# Patient Record
Sex: MALE | Race: BLACK OR AFRICAN AMERICAN | Employment: OTHER | URBAN - METROPOLITAN AREA
[De-identification: names, ages, dates, MRNs, and addresses within clinical notes are randomized per-mention and may not be internally consistent; named-entity substitution may affect disease eponyms.]

---

## 2017-02-09 ENCOUNTER — TELEPHONE (OUTPATIENT)
Dept: INTERNAL MEDICINE CLINIC | Age: 57
End: 2017-02-09

## 2017-02-09 NOTE — TELEPHONE ENCOUNTER
----- Message from Guanako Meek LPN sent at 7/2/8419  8:18 AM EST -----  Regarding: FW:mauricio  Contact: [phone]  Patients reply.   ----- Message -----     From: Tenzin Mina. Sent: 2/7/2017   3:05 PM       To: [facility] [place]  Subject: Dante Chacon,    I have spoke with [address] [facility], [place]) and they have Taladafil Troches at 20mg for far less than standard Cialis 20mg tablets.  $6.00/pill v. $60/pill  They indicate that I have to ask you to fax them a prescription for this med. I haven't heard of Troches before. Is this quite new or just maybe generic version? Their fax number for forwarding a prescription is [phone] and office is [phone]    [facility] [place] [phone]    TADALAFIL TROCHES: Tadalafil is commonly prescribed for: Treatment of erectile dysfunction  Dosage strengths of Tadalafil:  10mg and 20mg  Dosage form available for Tadalafil is a seema. [facility]s troches are compounded under the stringent  guidelines. ----- Message -----  From: Noemi Castelan MD  Sent: 2/2/2017  5:09 PM EST  To: Tenzin Mina. Subject: RE:cialis    Yes but for ED you would get 10 pills / month. 5 mg costs same as 20 mg for ED. Would get 10 tabs of the 20 mg in that case. Hopefully that makes sense. These meds should be getting cheaper soon as generics become available over next year.      ----- Message -----     From: Tenzin Mina. Sent: 2/2/2017  1:19 PM EST       To: Noemi Castelan MD  Subject: RE:mauricio    Correct. .just ED that i know of. ....would taking four 5mg do same as 20mg?  ----- Message -----  From: Noemi Castelan MD  Sent: 2/2/2017 12:28 PM EST  To: Tenzin Mina. Subject: cialis    cialis 5 mg daily is for prostate problems. cialis 10-20 mg as needed is for erectile dysfunction. I assume we're treating erectile dysfunction as I have nothing in your record on prostate issues. Correct?

## 2017-02-10 ENCOUNTER — TELEPHONE (OUTPATIENT)
Dept: INTERNAL MEDICINE CLINIC | Age: 57
End: 2017-02-10

## 2017-02-10 DIAGNOSIS — N52.9 ERECTILE DYSFUNCTION, UNSPECIFIED ERECTILE DYSFUNCTION TYPE: Primary | ICD-10-CM

## 2017-02-10 RX ORDER — TADALAFIL 20 MG/1
20 TABLET ORAL AS NEEDED
Qty: 10 TAB | Refills: 12 | Status: SHIPPED | OUTPATIENT
Start: 2017-02-10 | End: 2021-08-17 | Stop reason: SDUPTHER

## 2017-02-10 NOTE — TELEPHONE ENCOUNTER
----- Message from Tiffanie Shah LPN sent at 8/95/0649 12:52 PM EST -----  Regarding: FW:mauricio  Contact: [phone]  Patients reply  ----- Message -----     From: Luis M Thomas. Sent: 2/9/2017  12:13 PM       To: [facility]  Subject: RE:cialis                                        Hi,  Viagra and Levitra both give  me blurred vision, headaches and backaches. Cialis is only one that has no side effects for me. I see [address] [place] [address] does sell regular Cialis 20mg tablets too. They will not give me pricing without prescription. Since [place] does not cover any ED meds at all, can we transfer my regular Cialis 20mg prescription from [facility] to [address] [place] [address], since I have to pay full price any way they may be cheaper than [facility]. [email or web address]. [address] Geannie Babinski [phone]  ----- Message -----  From: Staci Elliott MD  Sent: 2/9/2017 11:03 AM EST  To: Luis M Thomas. Subject: RE:cialis    I looked at their website on this drug.  guidelines only confirm safe practice in compounding the formulation but not distribution/ safety of that formulation for patients - that is how it distributes in the body/ blood levels etc even though it is same molecule as cialis. This information is important but unknown. Therefore, without FDA safety data and pharmacokinetic information, I'm hesitant to prescribe that due to risk. viagra should be much cheaper with coupons now and going generic soon however. Al    ----- Message -----     From: Luis M Thomas. Sent: 2/7/2017  3:05 PM EST       To: Staci Elliott MD  Subject: Niyah Weiss,    I have spoke with [address] [facility], [place]) and they have Taladafil Troches at 20mg for far less than standard Cialis 20mg tablets.  $6.00/pill v. $60/pill  They indicate that I have to ask you to fax them a prescription for this med. I haven't heard of Sukhdeep before.   Is this quite new or just maybe generic version? Their fax number for forwarding a prescription is [phone] and office is [phone]    Sirisha Tilley [phone]    TADALAFIL TROCHES: Tadalafil is commonly prescribed for: Treatment of erectile dysfunction  Dosage strengths of Tadalafil:  10mg and 20mg  Dosage form available for Tadalafil is a seema. [facility]s troches are compounded under the stringent  guidelines. ----- Message -----  From: Luz Sofia MD  Sent: 2/2/2017  5:09 PM EST  To: Wisam Lugo. Subject: RE:cialis    Yes but for ED you would get 10 pills / month. 5 mg costs same as 20 mg for ED. Would get 10 tabs of the 20 mg in that case. Hopefully that makes sense. These meds should be getting cheaper soon as generics become available over next year.      ----- Message -----     From: Wisam Lugo. Sent: 2/2/2017  1:19 PM EST       To: Luz Sofia MD  Subject: RE:jerricalis    Correct. .just ED that i know of. ....would taking four 5mg do same as 20mg?  ----- Message -----  From: Luz Sofia MD  Sent: 2/2/2017 12:28 PM EST  To: Wisam Lugo. Subject: cialis    cialis 5 mg daily is for prostate problems. cialis 10-20 mg as needed is for erectile dysfunction. I assume we're treating erectile dysfunction as I have nothing in your record on prostate issues. Correct?

## 2017-02-10 NOTE — TELEPHONE ENCOUNTER
----- Message from Laurence Philippe LPN sent at 6/67/0564 12:52 PM EST -----  Regarding: FW:mauricio  Contact: [phone]  Patients reply  ----- Message -----     From: Gaurav John. Sent: 2/9/2017  12:13 PM       To: [facility]  Subject: RE:cialis                                        Hi,  Viagra and Levitra both give  me blurred vision, headaches and backaches. Cialis is only one that has no side effects for me. I see [address] [place] [address] does sell regular Cialis 20mg tablets too. They will not give me pricing without prescription. Since [place] does not cover any ED meds at all, can we transfer my regular Cialis 20mg prescription from [facility] to [address] [place] [address], since I have to pay full price any way they may be cheaper than [facility]. [email or web address]/[email or web address]-tablets. [address] [phone]  ----- Message -----  From: Bhavana Michaels MD  Sent: 2/9/2017 11:03 AM EST  To: Gaurav John. Subject: RE:cialis    I looked at their website on this drug.  guidelines only confirm safe practice in compounding the formulation but not distribution/ safety of that formulation for patients - that is how it distributes in the body/ blood levels etc even though it is same molecule as cialis. This information is important but unknown. Therefore, without FDA safety data and pharmacokinetic information, I'm hesitant to prescribe that due to risk. viagra should be much cheaper with coupons now and going generic soon however. Al    ----- Message -----     From: Gaurav John. Sent: 2/7/2017  3:05 PM EST       To: Bhavana Michaels MD  Subject: Марина Holland,    I have spoke with [address] [facility], [place]) and they have Taladafil Troches at 20mg for far less than standard Cialis 20mg tablets.  $6.00/pill v. $60/pill  They indicate that I have to ask you to fax them a prescription for this med. I haven't heard of Sukhdeep before.   Is this quite new or just maybe generic version? Their fax number for forwarding a prescription is [phone] and office is [phone]    Carlidana Hammer [phone]    TADALAFIL TROCHES: Tadalafil is commonly prescribed for: Treatment of erectile dysfunction  Dosage strengths of Tadalafil:  10mg and 20mg  Dosage form available for Tadalafil is a seema. [facility]s troches are compounded under the stringent  guidelines. ----- Message -----  From: Ambar Mckeon MD  Sent: 2/2/2017  5:09 PM EST  To: Roger Yeung. Subject: RE:cialis    Yes but for ED you would get 10 pills / month. 5 mg costs same as 20 mg for ED. Would get 10 tabs of the 20 mg in that case. Hopefully that makes sense. These meds should be getting cheaper soon as generics become available over next year.      ----- Message -----     From: Roger Yeung. Sent: 2/2/2017  1:19 PM EST       To: Ambar Mckeon MD  Subject: RE:jerricalis    Correct. .just ED that i know of. ....would taking four 5mg do same as 20mg?  ----- Message -----  From: Ambar Mckeon MD  Sent: 2/2/2017 12:28 PM EST  To: Roger Yeung. Subject: cialis    cialis 5 mg daily is for prostate problems. cialis 10-20 mg as needed is for erectile dysfunction. I assume we're treating erectile dysfunction as I have nothing in your record on prostate issues. Correct?

## 2017-04-05 ENCOUNTER — TELEPHONE (OUTPATIENT)
Dept: INTERNAL MEDICINE CLINIC | Age: 57
End: 2017-04-05

## 2017-04-05 NOTE — TELEPHONE ENCOUNTER
Discussed with pt . He reports sun burning with untinted windows. He does not have photosensitivity diseases known. Does not have cutaneous sarcoid.   Forms completed with this info

## 2017-04-05 NOTE — TELEPHONE ENCOUNTER
Pt called and needs DMV forms signed. He would like them faxed to [phone] and the hardcopies mailed to his home address.

## 2017-04-06 ENCOUNTER — TELEPHONE (OUTPATIENT)
Dept: INTERNAL MEDICINE CLINIC | Age: 57
End: 2017-04-06

## 2017-04-06 NOTE — TELEPHONE ENCOUNTER
----- Message from Princess Kelley sent at 4/6/2017  7:33 AM EDT -----  Regarding: Dr. Manny Anderson has not received the [place] [employer] Application for DMV to cover the [facility] and [place]. His contact number is, [phone]. his fax number is, [phone].

## 2017-09-11 ENCOUNTER — HOSPITAL ENCOUNTER (OUTPATIENT)
Dept: GENERAL RADIOLOGY | Age: 57
Discharge: HOME | End: 2017-09-11
Payer: COMMERCIAL

## 2017-09-11 DIAGNOSIS — D86.9 SARCOID: ICD-10-CM

## 2017-09-11 PROCEDURE — 71020 XR CHEST PA LAT: CPT

## 2018-05-09 RX ORDER — OMEPRAZOLE 40 MG/1
CAPSULE, DELAYED RELEASE ORAL
Qty: 30 CAP | Refills: 4 | Status: SHIPPED | OUTPATIENT
Start: 2018-05-09 | End: 2021-08-17 | Stop reason: SDUPTHER

## 2021-08-17 ENCOUNTER — OFFICE VISIT (OUTPATIENT)
Dept: FAMILY MEDICINE CLINIC | Age: 61
End: 2021-08-17
Payer: COMMERCIAL

## 2021-08-17 VITALS
DIASTOLIC BLOOD PRESSURE: 72 MMHG | BODY MASS INDEX: 39.34 KG/M2 | SYSTOLIC BLOOD PRESSURE: 133 MMHG | TEMPERATURE: 97.8 F | RESPIRATION RATE: 17 BRPM | OXYGEN SATURATION: 97 % | HEART RATE: 78 BPM | WEIGHT: 281 LBS | HEIGHT: 71 IN

## 2021-08-17 DIAGNOSIS — Z76.89 ENCOUNTER TO ESTABLISH CARE: ICD-10-CM

## 2021-08-17 DIAGNOSIS — N52.9 ERECTILE DYSFUNCTION, UNSPECIFIED ERECTILE DYSFUNCTION TYPE: ICD-10-CM

## 2021-08-17 DIAGNOSIS — E66.9 CLASS 2 OBESITY WITH BODY MASS INDEX (BMI) OF 39.0 TO 39.9 IN ADULT, UNSPECIFIED OBESITY TYPE, UNSPECIFIED WHETHER SERIOUS COMORBIDITY PRESENT: Primary | ICD-10-CM

## 2021-08-17 DIAGNOSIS — E11.65 TYPE 2 DIABETES MELLITUS WITH HYPERGLYCEMIA, WITHOUT LONG-TERM CURRENT USE OF INSULIN (HCC): ICD-10-CM

## 2021-08-17 DIAGNOSIS — D86.9 SARCOIDOSIS: ICD-10-CM

## 2021-08-17 DIAGNOSIS — M79.604 RIGHT LEG PAIN: ICD-10-CM

## 2021-08-17 PROCEDURE — 99204 OFFICE O/P NEW MOD 45 MIN: CPT | Performed by: STUDENT IN AN ORGANIZED HEALTH CARE EDUCATION/TRAINING PROGRAM

## 2021-08-17 RX ORDER — OMEPRAZOLE 40 MG/1
40 CAPSULE, DELAYED RELEASE ORAL DAILY
Qty: 90 CAPSULE | Refills: 3 | Status: SHIPPED | OUTPATIENT
Start: 2021-08-17

## 2021-08-17 RX ORDER — DICLOFENAC SODIUM 50 MG/1
50 TABLET, DELAYED RELEASE ORAL 2 TIMES DAILY
Qty: 60 TABLET | Refills: 0 | Status: SHIPPED | OUTPATIENT
Start: 2021-08-17 | End: 2021-09-16

## 2021-08-17 RX ORDER — TIOTROPIUM BROMIDE AND OLODATEROL 3.124; 2.736 UG/1; UG/1
2 SPRAY, METERED RESPIRATORY (INHALATION) DAILY
Qty: 3 INHALER | Refills: 3 | Status: SHIPPED | OUTPATIENT
Start: 2021-08-17

## 2021-08-17 RX ORDER — AZELASTINE HYDROCHLORIDE, FLUTICASONE PROPIONATE 137; 50 UG/1; UG/1
SPRAY, METERED NASAL
COMMUNITY
End: 2021-08-23 | Stop reason: SDUPTHER

## 2021-08-17 RX ORDER — TADALAFIL 20 MG/1
20 TABLET ORAL AS NEEDED
Qty: 30 TABLET | Refills: 1 | Status: SHIPPED | OUTPATIENT
Start: 2021-08-17 | End: 2021-08-23 | Stop reason: SDUPTHER

## 2021-08-17 NOTE — PROGRESS NOTES
Robin Bui. is a 59 y.o. male    Chief Complaint   Patient presents with   [facility], med renew. Referrals for toe nail fungus, skin tags, opthamalogist,  right leg pain, and colonoscopy       1. Have you been to the ER, urgent care clinic since your last visit? Hospitalized since your last visit? No  2. Have you seen or consulted any other health care providers outside of the [address] [place] [address] since your last visit? Include any pap smears or colon screening.  No    Visit Vitals  /72 (BP 1 Location: Right arm)   Pulse 78   Temp 97.8 °F (36.6 °C) (Temporal)   Resp 17   Ht 5' 11\" (1.803 m)   Wt 281 lb (127.5 kg)   SpO2 97%   BMI 39.19 kg/m²

## 2021-08-17 NOTE — PROGRESS NOTES
Lulu Goel. is an 59 y.o. male who presents to hospitals care   Patient was previously receiving care at: [facility], recently changed insurance so moving back to this clinic  Medical history significant for:    NACHO - on CPAP  Sarcoid - F/w pulm Dr. Dee Rachel  T2DM - A1C of 12 in 2011 however has been well controlled since that time - not currently on any meds  ED - takes Cialis PRN    Today pt complaining of some R leg pain that he has occasionally  Sometimes pain shoots down leg, sometimes he feels like it \"gives out\"  No loss of control of bowel or bladder, no saddle anesthesia  Has taken diclofenac PO previously which works well for the pain  Interested in following up w/ sports med    Review of Systems   General/Constitutional:   No headache, fever, fatigue, weight loss or weight gain       Eyes:   No redness, pruritis, pain, visual changes, swelling, or discharge      Ears:    No pain, loss or changes in hearing     Neck:   No swelling, masses, stiffness, pain, or limited movement     Cardiac:    No chest pain      Respiratory:   No cough or shortness of breath     GI:   No nausea/vomiting, diarrhea, abdominal pain, bloody or dark stools       :   No dysuria or  hematuria    Neurological:   No loss of consciousness, dizziness, seizures, dysarthria, cognitive changes, memory changes,  problems with balance, or unilateral weakness     Skin: No rash     Current Medications  Current medications include:   Current Outpatient Medications   Medication Sig    azelastine-fluticasone (Dymista) 137-50 mcg/spray spry by Nasal route.  omeprazole (PRILOSEC) 40 mg capsule Take 1 Capsule by mouth daily.  tadalafiL (Cialis) 20 mg tablet Take 1 Tablet by mouth as needed for Erectile Dysfunction. Indications: the inability to have an erection    tiotropium-olodateroL (Stiolto Respimat) 2.5-2.5 mcg/actuation inhaler Take 2 Puffs by inhalation daily.     diclofenac EC (VOLTAREN) 50 mg EC tablet Take 1 Tablet by mouth two (2) times a day for 30 days. No current facility-administered medications for this visit. Allergies  No Known Allergies    Past Medical History  Past Medical History:   Diagnosis Date    AR (allergic rhinitis)     Arthritis     [facility]     Diabetes [facility]     ED (erectile dysfunction)     GERD (gastroesophageal reflux disease)     History of stress test 5/5/15    pt states results were normal    Hyperlipidemia LDL goal < 100     Sarcoidosis 2015    Skin tag(s)     Tinea pedis        Past Surgical History   Past Surgical History:   Procedure Laterality Date    CHEST SURGERY PROCEDURE UNLISTED      bronchoscopy May 2015 diagnosed sarcoidosis    HX APPENDECTOMY      HX HEENT      sinus lifts    HX SMALL BOWEL RESECTION      scar tissue from appendectomy in  caused obstruction    ORAL SURGERY PROCEDURE      dental implants    IA EXC SKIN BENIG 2.1-3CM TRUNK,ARM,LEG  2011    seborrheic keratosis excised from right upper back and left forearm       Family History  Family History   Problem Relation Age of Onset    Cancer Mother 50        breast    Cancer Father         prostate    Diabetes Father     Hypertension Father     Other Father         gout    Heart Disease Neg Hx      No FH of colon cancer: No but pt did have adenomatous poly removed last colonoscopy.  Due for 5-yr f/u w/ GI Dr Dilcia Ramsey History     Socioeconomic History    Marital status:      Spouse name: Not on file    Number of children: Not on file    Years of education: Not on file    Highest education level: Not on file   Occupational History    Not on file   Tobacco Use    Smoking status: Former Smoker     Packs/day: 3.00     Years: 30.00     Pack years: 90.00     Types: Cigarettes     Quit date: 2009     Years since quittin.6    Smokeless tobacco: Former User     Quit date:  Substance and Sexual Activity    Alcohol use: Yes     Alcohol/week: 6.0 standard drinks     Types: 2 Standard drinks or equivalent, 1 Cans of beer, 3 Shots of liquor per week    Drug use: No     Comment: history of not currently     Sexual activity: Not Currently   Other Topics Concern    Not on file   Social History Narrative    Not on file     Social Determinants of Health     Financial Resource Strain:     Difficulty of Paying Living Expenses:    Food Insecurity:     Worried About Running Out of Food in the Last Year:     Ran Out of Food in the Last Year:    Transportation Needs:     Lack of Transportation (Medical):  Lack of Transportation (Non-Medical):    Physical Activity:     Days of Exercise per Week:     Minutes of Exercise per Session:    Stress:     Feeling of Stress :    Social Connections:     Frequency of Communication with Friends and Family:     Frequency of Social Gatherings with Friends and Family:     Attends Sikh Services:     Active Member of Clubs or Organizations:     Attends Club or Organization Meetings:     Marital Status:    Intimate Partner Violence:     Fear of Current or Ex-Partner:     Emotionally Abused:     Physically Abused:     Sexually Abused:        Immunizations  Immunization History   Administered Date(s) Administered    (RETIRED) Pneumococcal Vaccine (Unspecified Type) 01/31/2011    COVID-19, J&J, PF, 0.5 mL Dose 03/19/2021    Influenza Vaccine 10/01/2015    Influenza Vaccine Air Button) PF (>6 Mo Flulaval, Fluarix, and >3 Yrs Afluria, Fluzone 64435) 12/17/2014    TDAP Vaccine 01/31/2011       Health Maintenance  Colonoscopy: Due for 5yr f/u  DEXA scan: NA  Hepatitis C testing:  Will get today  Lung cancer screening: F/w pulmonology    Objective   Vital Signs  Visit Vitals  /72 (BP 1 Location: Right arm)   Pulse 78   Temp 97.8 °F (36.6 °C) (Temporal)   Resp 17   Ht 5' 11\" (1.803 m)   Wt 281 lb (127.5 kg)   SpO2 97%   BMI 39.19 kg/m² Physical Examination  GEN: No apparent distress. Alert and oriented and responds to all questions appropriately. EYES:  Conjunctiva clear; extraocular movements areintact. EAR: External ears are normal.   NOSE: Turbinates are within normal limits. No drainage  OROPHYARYNX: No oral lesions or exudates. NECK:  Supple; no masses; thyroid normal           LUNGS: Respirations unlabored; clear to auscultation bilaterally  CARDIOVASCULAR: Regular, rate, and rhythm without murmurs, gallops or rubs   ABDOMEN: Soft; nontender; nondistended; normoactive bowel sounds; no masses or organomegaly  NEUROLOGIC:  No focal neurologic deficits. Strength and sensation grossly intact. Coordination and gait grossly intact. EXT: Well perfused. No edema. Good ROM to BLE, no weakness or deficit to RLE. SKIN: No obvious rashes. Assessment:   Kathy Miranda is a 59 y.o. here to establish to care     Plan:     Diagnoses and all orders for this visit:    1. Class 2 obesity with body mass index (BMI) of 39.0 to 39.9 in adult, unspecified obesity type, unspecified whether serious comorbidity present  -     LIPID PANEL; Future  -     omeprazole (PRILOSEC) 40 mg capsule; Take 1 Capsule by mouth daily. 2. Encounter to establish care  -     CBC W/O DIFF; Future  -     HEPATITIS C AB; Future  -     REFERRAL TO GASTROENTEROLOGY    3. Type 2 diabetes mellitus with hyperglycemia, without long-term current use of insulin (HCC)  -     METABOLIC PANEL, BASIC; Future  -     LIPID PANEL; Future  -     HEMOGLOBIN A1C WITH EAG; Future  -     REFERRAL TO OPHTHALMOLOGY    4. Erectile dysfunction, unspecified erectile dysfunction type  -     tadalafiL (Cialis) 20 mg tablet; Take 1 Tablet by mouth as needed for Erectile Dysfunction. Indications: the inability to have an erection    5. Sarcoidosis  -     tiotropium-olodateroL (Stiolto Respimat) 2.5-2.5 mcg/actuation inhaler; Take 2 Puffs by inhalation daily.     6. Right leg pain  - diclofenac EC (VOLTAREN) 50 mg EC tablet; Take 1 Tablet by mouth two (2) times a day for 30 days. · Counseled re: diet, exercise, healthy lifestyle  · Appropriate labs, vaccines, imaging studies, and referrals ordered as listed above  · Discussed the patient's BMI with him. The BMI follow up plan is as follows: Continue dietary changes, consider referral to dietician in the future. · The patient was counseled on the dangers of tobacco use, and was advised to quit. Reviewed strategies to maximize success, including written materials. Follow-up and Dispositions    · Return in about 4 weeks (around 9/14/2021) for Wellness exam.         I discussed the aforementioned diagnoses with the patient as well as the plan of care. All questions were answered and an AVS was provided.      I discussed this patient with Dr. Carmenza Galdamez (Attending Physician)      Signed By:  Bebe Lawler MD

## 2021-08-17 NOTE — PATIENT INSTRUCTIONS
Well Visit, Men 48 to 72: Care Instructions  Overview     Well visits can help you stay healthy. Your doctor has checked your overall health and may have suggested ways to take good care of yourself. Your doctor also may have recommended tests. At home, you can help prevent illness with healthy eating, regular exercise, and other steps. Follow-up care is a key part of your treatment and safety. Be sure to make and go to all appointments, and call your doctor if you are having problems. It's also a good idea to know your test results and keep a list of the medicines you take. How can you care for yourself at home? · Get screening tests that you and your doctor decide on. Screening helps find diseases before any symptoms appear. · Eat healthy foods. Choose fruits, vegetables, whole grains, protein, and low-fat dairy foods. Limit fat, especially saturated fat. Reduce salt in your diet. · Limit alcohol. Have no more than 2 drinks a day or 14 drinks a week. · Get at least 30 minutes of exercise on most days of the week. Walking is a good choice. You also may want to do other activities, such as running, swimming, cycling, or playing tennis or team sports. · Reach and stay at a healthy weight. This will lower your risk for many problems, such as obesity, diabetes, heart disease, and high blood pressure. · Do not smoke. Smoking can make health problems worse. If you need help quitting, talk to your doctor about stop-smoking programs and medicines. These can increase your chances of quitting for good. · Care for your mental health. It is easy to get weighed down by worry and stress. Learn strategies to manage stress, like deep breathing and mindfulness, and stay connected with your family and community. If you find you often feel sad or hopeless, talk with your doctor. Treatment can help. · Talk to your doctor about whether you have any risk factors for sexually transmitted infections (STIs).  You can help prevent STIs if you wait to have sex with a new partner (or partners) until you've each been tested for STIs. It also helps if you use condoms (male or female condoms) and if you limit your sex partners to one person who only has sex with you. Vaccines are available for some STIs. · If it's important to you to prevent pregnancy with your partner, talk with your doctor about birth control options that might be best for you. · If you think you may have a problem with alcohol or drug use, talk to your doctor. This includes prescription medicines (such as amphetamines and opioids) and illegal drugs (such as cocaine and methamphetamine). Your doctor can help you figure out what type of treatment is best for you. · Protect your skin from too much sun. When you're outdoors from 10 a.m. to 4 p.m., stay in the shade or cover up with clothing and a hat with a wide brim. Wear sunglasses that block UV rays. Even when it's cloudy, put broad-spectrum sunscreen (SPF 30 or higher) on any exposed skin. · See a dentist one or two times a year for checkups and to have your teeth cleaned. · Wear a seat belt in the car. When should you call for help? Watch closely for changes in your health, and be sure to contact your doctor if you have any problems or symptoms that concern you. Where can you learn more? Go to http://www.gray.com/  Enter Q428 in the search box to learn more about \"Well Visit, Men 48 to 72: Care Instructions. \"  Current as of: May 27, 2020               Content Version: 12.8  © 6897-8012 Healthwise, [employer]orporated. Care instructions adapted under license by [employer] (which disclaims liability or warranty for this information). If you have questions about a medical condition or this instruction, always ask your healthcare professional. [place] [address] any warranty or liability for your use of this information.

## 2021-08-18 LAB
ANION GAP SERPL CALC-SCNC: 6 MMOL/L (ref 5–15)
BUN SERPL-MCNC: 10 MG/DL (ref 6–20)
BUN/CREAT SERPL: 12 (ref 12–20)
CALCIUM SERPL-MCNC: 9.3 MG/DL (ref 8.5–10.1)
CHLORIDE SERPL-SCNC: 108 MMOL/L (ref 97–108)
CHOLEST SERPL-MCNC: 189 MG/DL
CO2 SERPL-SCNC: 25 MMOL/L (ref 21–32)
CREAT SERPL-MCNC: 0.82 MG/DL (ref 0.7–1.3)
ERYTHROCYTE [DISTWIDTH] IN BLOOD BY AUTOMATED COUNT: 13.3 % (ref 11.5–14.5)
EST. AVERAGE GLUCOSE BLD GHB EST-MCNC: 131 MG/DL
GLUCOSE SERPL-MCNC: 123 MG/DL (ref 65–100)
HBA1C MFR BLD: 6.2 % (ref 4–5.6)
HCT VFR BLD AUTO: 43.1 % (ref 36.6–50.3)
HCV AB SERPL QL IA: NONREACTIVE
HDLC SERPL-MCNC: 56 MG/DL
HDLC SERPL: 3.4 {RATIO} (ref 0–5)
HGB BLD-MCNC: 14.1 G/DL (ref 12.1–17)
LDLC SERPL CALC-MCNC: 108 MG/DL (ref 0–100)
MCH RBC QN AUTO: 30.5 PG (ref 26–34)
MCHC RBC AUTO-ENTMCNC: 32.7 G/DL (ref 30–36.5)
MCV RBC AUTO: 93.1 FL (ref 80–99)
NRBC # BLD: 0 K/UL (ref 0–0.01)
NRBC BLD-RTO: 0 PER 100 WBC
PLATELET # BLD AUTO: 219 K/UL (ref 150–400)
PMV BLD AUTO: 9.4 FL (ref 8.9–12.9)
POTASSIUM SERPL-SCNC: 4.2 MMOL/L (ref 3.5–5.1)
RBC # BLD AUTO: 4.63 M/UL (ref 4.1–5.7)
SODIUM SERPL-SCNC: 139 MMOL/L (ref 136–145)
TRIGL SERPL-MCNC: 125 MG/DL (ref ?–150)
VLDLC SERPL CALC-MCNC: 25 MG/DL
WBC # BLD AUTO: 4 K/UL (ref 4.1–11.1)

## 2021-08-23 DIAGNOSIS — N52.9 ERECTILE DYSFUNCTION, UNSPECIFIED ERECTILE DYSFUNCTION TYPE: ICD-10-CM

## 2021-08-23 RX ORDER — TADALAFIL 20 MG/1
20 TABLET ORAL AS NEEDED
Qty: 30 TABLET | Refills: 1 | Status: SHIPPED | OUTPATIENT
Start: 2021-08-23 | End: 2022-05-14

## 2021-08-23 RX ORDER — AZELASTINE HYDROCHLORIDE, FLUTICASONE PROPIONATE 137; 50 UG/1; UG/1
1 SPRAY, METERED NASAL 2 TIMES DAILY
Qty: 23 G | Refills: 3 | Status: SHIPPED | OUTPATIENT
Start: 2021-08-23 | End: 2022-05-12

## 2021-08-24 NOTE — PROGRESS NOTES
Hep C, lipid panel, CBC, BMP wnl. A1C at goal without medication. [employer] message and letter sent to pt.

## 2021-08-30 ENCOUNTER — OFFICE VISIT (OUTPATIENT)
Dept: FAMILY MEDICINE CLINIC | Age: 61
End: 2021-08-30
Payer: COMMERCIAL

## 2021-08-30 VITALS
TEMPERATURE: 97.1 F | HEART RATE: 68 BPM | WEIGHT: 282 LBS | OXYGEN SATURATION: 97 % | BODY MASS INDEX: 39.48 KG/M2 | DIASTOLIC BLOOD PRESSURE: 74 MMHG | SYSTOLIC BLOOD PRESSURE: 145 MMHG | HEIGHT: 71 IN | RESPIRATION RATE: 16 BRPM

## 2021-08-30 DIAGNOSIS — M79.604 RIGHT LEG PAIN: ICD-10-CM

## 2021-08-30 DIAGNOSIS — M54.41 RIGHT-SIDED LOW BACK PAIN WITH RIGHT-SIDED SCIATICA, UNSPECIFIED CHRONICITY: Primary | ICD-10-CM

## 2021-08-30 PROCEDURE — 99213 OFFICE O/P EST LOW 20 MIN: CPT | Performed by: FAMILY MEDICINE

## 2021-08-30 NOTE — PROGRESS NOTES
Jean Paul Blakely. is a 59 y.o. male    Chief Complaint   Patient presents with    Back Pain     low back pain that radiates down to his right foot. it's mostly aching but the pain does feel shooting/sharp when it radiates down his leg. states his right leg feels cooler than his left leg. has weakness in that leg as well when walking or standing for long periods of time. states he has notices some swelling - has been dx with sarcoidosis       1. Have you been to the ER, urgent care clinic since your last visit? Hospitalized since your last visit? No    2. Have you seen or consulted any other health care providers outside of the [address] [place] [address] since your last visit? Include any pap smears or colon screening. No      Visit Vitals  BP (!) 145/74 (BP 1 Location: Right arm, BP Patient Position: Sitting, BP Cuff Size: Large adult)   Pulse 68   Temp 97.1 °F (36.2 °C) (Temporal)   Resp 16   Ht 5' 11\" (1.803 m)   Wt 282 lb (127.9 kg)   SpO2 97%   BMI 39.33 kg/m²           Health Maintenance Due   Topic Date Due    Foot Exam Q1  Never done    Eye Exam Retinal or Dilated  Never done    Shingrix Vaccine Age 50> (1 of 2) Never done    Low dose CT lung screening  Never done    MICROALBUMIN Q1  08/25/2017    Colorectal Cancer Screening Combo  08/12/2020    DTaP/Tdap/Td series (2 - Td or Tdap) 01/31/2021         Medication Reconciliation completed, changes noted.   Please  Update medication list. Followed protocol

## 2021-08-30 NOTE — PROGRESS NOTES
[address] [facility]      Chief Complaint:   Chief Complaint   Patient presents with    Back Pain     low back pain that radiates down to his right foot. it's mostly aching but the pain does feel shooting/sharp when it radiates down his leg. states his right leg feels cooler than his left leg. has weakness in that leg as well when walking or standing for long periods of time. states he has notices some swelling - has been dx with sarcoidosis       Subjective:   History: This patient is a 59 y.o. male with a chief complaint of back pain. Patient states that he began experiencing low back pain that radiates down to his right foot. He noticed it especially a few years ago when he went to [place] [address] with his family and felt that he was slower than the rest of his family. At grocery stores, he occasionally uses a cart to lean on, which allows him to walk for a longer period of time. Also states that he is typically able to relieve the pain with standing, as the pain is worse with extended periods of walking. He notes he previously had groin numbness (after sitting on wallet while driving), but feels his pain now is dissimilar. Has purchased an ab roller for core toning, and feels it has helped. He has been doing a low back home exercise program for a couple of months with minimal benefit. No injury or trauma. No bowel or bladder incontinence. No fever, night sweats, or weight changes. No saddle anesthesia. Was previously seen by PT, and has continued home exercise program for back pain. Review of Systems:  General/Constitutional:  No fever, chills, sweats, fatigue, night sweats, weakness, weight loss or weight gain   Head: No headache, no trauma   Neck: No swelling, masses, stiffness, pain, or limited movement   Cardiac: No chest pain   Respiratory: No cough, shortness of breath, or dyspnea on exertion   GI: No incontinence. No nausea/vomiting, diarrhea, abdominal pain, bloody or dark stools  : No incontinence. No change in urinary habits. Peripheral Vascular: No edema, coldness, numbness, discoloration, pain, or paresthesias   Musculoskeletal: As per HPI  Neurological: No saddle distribution paresthesia. No siatic pain. No loss of consciousness, dizziness, seizures, dysarthria, cognitive changes, problems with balance, or unilateral weakness. Past Medical History:   Diagnosis Date    AR (allergic rhinitis)     Arthritis     [facility]     Diabetes ([facility]     ED (erectile dysfunction)     GERD (gastroesophageal reflux disease)     History of stress test 5/5/15    pt states results were normal    Hyperlipidemia LDL goal < 100     Sarcoidosis 4/2015    Skin tag(s)     Tinea pedis      Family History   Problem Relation Age of Onset    Cancer Mother 50        breast    Cancer Father         prostate    Diabetes Father     Hypertension Father     Other Father         gout    Heart Disease Neg Hx      Current Outpatient Medications   Medication Sig Dispense Refill    tadalafiL (Cialis) 20 mg tablet Take 1 Tablet by mouth as needed for Erectile Dysfunction. Indications: the inability to have an erection 30 Tablet 1    omeprazole (PRILOSEC) 40 mg capsule Take 1 Capsule by mouth daily. 90 Capsule 3    tiotropium-olodateroL (Stiolto Respimat) 2.5-2.5 mcg/actuation inhaler Take 2 Puffs by inhalation daily. 3 Inhaler 3    diclofenac EC (VOLTAREN) 50 mg EC tablet Take 1 Tablet by mouth two (2) times a day for 30 days. 60 Tablet 0    azelastine-fluticasone (Dymista) 137-50 mcg/spray spry 1 [place] by Both Nostrils route two (2) times a day.  (Patient not taking: Reported on 8/30/2021) 23 g 3     No Known Allergies  Social History     Socioeconomic History    Marital status:      Spouse name: Not on file    Number of children: Not on file    Years of education: Not on file    Highest education level: Not on file   Occupational History    Not on file   Tobacco Use    Smoking status: Former Smoker     Packs/day: 3.00     Years: 30.00     Pack years: 90.00     Types: Cigarettes     Quit date: 2009     Years since quittin.6    Smokeless tobacco: Former User     Quit date:    Substance and Sexual Activity    Alcohol use: Yes     Alcohol/week: 6.0 standard drinks     Types: 2 Standard drinks or equivalent, 1 Cans of beer, 3 Shots of liquor per week    Drug use: No     Comment: history of not currently     Sexual activity: Not Currently   Other Topics Concern    Not on file   Social History Narrative    Not on file     Social Determinants of Health     Financial Resource Strain:     Difficulty of Paying Living Expenses:    Food Insecurity:     Worried About Running Out of Food in the Last Year:     Ran Out of Food in the Last Year:    Transportation Needs:     Lack of Transportation (Medical):  Lack of Transportation (Non-Medical):    Physical Activity:     Days of Exercise per Week:     Minutes of Exercise per Session:    Stress:     Feeling of Stress :    Social Connections:     Frequency of Communication with Friends and Family:     Frequency of Social Gatherings with Friends and Family:     Attends Temple Services:     Active Member of Clubs or Organizations:     Attends Club or Organization Meetings:     Marital Status:    Intimate Partner Violence:     Fear of Current or Ex-Partner:     Emotionally Abused:     Physically Abused:     Sexually Abused:        Objective:     Visit Vitals  BP (!) 145/74 (BP 1 Location: Right arm, BP Patient Position: Sitting, BP Cuff Size: Large adult)   Pulse 68   Temp 97.1 °F (36.2 °C) (Temporal)   Resp 16   Ht 5' 11\" (1.803 m)   Wt 282 lb (127.9 kg)   SpO2 97%   BMI 39.33 kg/m²       General: Alert and oriented and in no acute distress. Responds to all questions appropriately.    LUNGS: Respirations unlabored. Skin: No obvious rash. MSK:    Posture: Normal   Deformity: None    ROM:     Flexion: Normal    Extension: Normal     Lateral bending: Normal      Gait: Normal       Palpation:    L1-L5: No tenderness    Sacrum: No tenderness    Coccyx: No tenderness    Left Paraspinal: No tenderness    Right Paraspinal: No tenderness     Strength (0-5/5)    Hip Flexion:   Left: 5/5  Right: 5/5    Hip Extension:  Left: 5/5  Right: 5/5    Hip Abduction:  Left: 5/5  Right: 5/5    Hip Adduction:  Left: 5/5  Right: 5/5    Knee Extension:  Left: 5/5  Right: 5/5    Knee Flexion:   Left: 5/5  Right: 5/5    Ankle dorsiflexion:  Left: 5/5  Right: 5/5    Ankle plantarflexion:  Left: 5/5  Right: 5/5    Great toe extension:  Left: 5/5  Right: 5/5     Sensation: Intact, no deficits      DTR:    Patella:  Left: +2  Right: +2    Achilles:  Left: +2  Right: +2     Special test:    Straight leg: Left: Negative  Right: negative    Tamaras: Left: Negative  Right: Negative    Piriformis: Left: Negative  Right: Negative      Assessment:   Low back pain with radicular sx down the right leg. Sx present with standing and walking and improving with rest and with back forward flexion. Sx likely due to foraminal or spinal stenosis. No improvement with HEP and NSAIDs. Will get MRI for further evaluation. Will also get ABIs to evaluate vascular claudication as cause of sx. Plan:   1. Home Exercise Program given, will consider PT if pt desires. 2. MRI low back to evaluate for foraminal/spinal stenosis. 3. Ice 15 minutes, three times a day PRN and after exercise. Can alternate with heat for 15 minutes. Medications:    1. Naproxin (Aleve): 220mg 1-2 tablets twice a day PRN. 2. Acetaminophen (Tylenol):  500mg 1-2 tablets every 6 hours as needed for pain.      RTC: TBD after MRI

## 2022-02-02 ENCOUNTER — TELEPHONE (OUTPATIENT)
Dept: FAMILY MEDICINE CLINIC | Age: 62
End: 2022-02-02

## 2022-02-02 NOTE — TELEPHONE ENCOUNTER
Hi Good PM  Pt called and asking about his referrals since he changed his Insurance to [facility] if he needs a new referral for colonoscopy and MRI?   Please advice    -Hemant Arteaga

## 2022-05-02 ENCOUNTER — TELEPHONE (OUTPATIENT)
Dept: FAMILY MEDICINE CLINIC | Age: 62
End: 2022-05-02

## 2022-05-02 NOTE — TELEPHONE ENCOUNTER
Hi Pt called wanted to ask questions to Dr Clive Magana regarding his Cialis prescription. Please call back at [phone].   Thank you      -karan

## 2022-05-03 ENCOUNTER — OFFICE VISIT (OUTPATIENT)
Dept: FAMILY MEDICINE CLINIC | Age: 62
End: 2022-05-03
Payer: MEDICARE

## 2022-05-03 VITALS
DIASTOLIC BLOOD PRESSURE: 72 MMHG | BODY MASS INDEX: 38.78 KG/M2 | TEMPERATURE: 97.3 F | WEIGHT: 277 LBS | HEART RATE: 74 BPM | SYSTOLIC BLOOD PRESSURE: 136 MMHG | OXYGEN SATURATION: 97 % | RESPIRATION RATE: 18 BRPM | HEIGHT: 71 IN

## 2022-05-03 DIAGNOSIS — N52.9 ERECTILE DYSFUNCTION, UNSPECIFIED ERECTILE DYSFUNCTION TYPE: ICD-10-CM

## 2022-05-03 DIAGNOSIS — E11.65 TYPE 2 DIABETES MELLITUS WITH HYPERGLYCEMIA, WITHOUT LONG-TERM CURRENT USE OF INSULIN (HCC): ICD-10-CM

## 2022-05-03 DIAGNOSIS — Z13.220 ENCOUNTER FOR LIPID SCREENING FOR CARDIOVASCULAR DISEASE: ICD-10-CM

## 2022-05-03 DIAGNOSIS — R53.82 CHRONIC FATIGUE: Primary | ICD-10-CM

## 2022-05-03 DIAGNOSIS — Z01.83 ENCOUNTER FOR BLOOD TYPING: ICD-10-CM

## 2022-05-03 DIAGNOSIS — Z13.6 ENCOUNTER FOR LIPID SCREENING FOR CARDIOVASCULAR DISEASE: ICD-10-CM

## 2022-05-03 LAB
ABO + RH BLD: NORMAL
ALBUMIN SERPL-MCNC: 3.9 G/DL (ref 3.5–5)
ALBUMIN/GLOB SERPL: 1.1 {RATIO} (ref 1.1–2.2)
ALP SERPL-CCNC: 70 U/L (ref 45–117)
ALT SERPL-CCNC: 32 U/L (ref 12–78)
ANION GAP SERPL CALC-SCNC: 11 MMOL/L (ref 5–15)
AST SERPL-CCNC: 25 U/L (ref 15–37)
BASOPHILS # BLD: 0 K/UL (ref 0–0.1)
BASOPHILS NFR BLD: 1 % (ref 0–1)
BILIRUB SERPL-MCNC: 0.7 MG/DL (ref 0.2–1)
BLOOD BANK CMNT PATIENT-IMP: NORMAL
BLOOD GROUP ANTIBODIES SERPL: NORMAL
BUN SERPL-MCNC: 14 MG/DL (ref 6–20)
BUN/CREAT SERPL: 13 (ref 12–20)
CALCIUM SERPL-MCNC: 9.3 MG/DL (ref 8.5–10.1)
CHLORIDE SERPL-SCNC: 104 MMOL/L (ref 97–108)
CHOLEST SERPL-MCNC: 211 MG/DL
CO2 SERPL-SCNC: 23 MMOL/L (ref 21–32)
CREAT SERPL-MCNC: 1.09 MG/DL (ref 0.7–1.3)
CREAT UR-MCNC: 320 MG/DL
DIFFERENTIAL METHOD BLD: ABNORMAL
EOSINOPHIL # BLD: 0.1 K/UL (ref 0–0.4)
EOSINOPHIL NFR BLD: 2 % (ref 0–7)
ERYTHROCYTE [DISTWIDTH] IN BLOOD BY AUTOMATED COUNT: 13.7 % (ref 11.5–14.5)
EST. AVERAGE GLUCOSE BLD GHB EST-MCNC: 137 MG/DL
GLOBULIN SER CALC-MCNC: 3.7 G/DL (ref 2–4)
GLUCOSE SERPL-MCNC: 171 MG/DL (ref 65–100)
HBA1C MFR BLD: 6.4 % (ref 4–5.6)
HCT VFR BLD AUTO: 42.5 % (ref 36.6–50.3)
HDLC SERPL-MCNC: 49 MG/DL
HDLC SERPL: 4.3 {RATIO} (ref 0–5)
HGB BLD-MCNC: 13.9 G/DL (ref 12.1–17)
IMM GRANULOCYTES # BLD AUTO: 0 K/UL (ref 0–0.04)
IMM GRANULOCYTES NFR BLD AUTO: 0 % (ref 0–0.5)
LDLC SERPL CALC-MCNC: 117.4 MG/DL (ref 0–100)
LYMPHOCYTES # BLD: 1.2 K/UL (ref 0.8–3.5)
LYMPHOCYTES NFR BLD: 44 % (ref 12–49)
MCH RBC QN AUTO: 30.3 PG (ref 26–34)
MCHC RBC AUTO-ENTMCNC: 32.7 G/DL (ref 30–36.5)
MCV RBC AUTO: 92.6 FL (ref 80–99)
MICROALBUMIN UR-MCNC: 1.4 MG/DL
MICROALBUMIN/CREAT UR-RTO: 4 MG/G (ref 0–30)
MONOCYTES # BLD: 0.4 K/UL (ref 0–1)
MONOCYTES NFR BLD: 14 % (ref 5–13)
NEUTS SEG # BLD: 1.1 K/UL (ref 1.8–8)
NEUTS SEG NFR BLD: 39 % (ref 32–75)
NRBC # BLD: 0 K/UL (ref 0–0.01)
NRBC BLD-RTO: 0 PER 100 WBC
PLATELET # BLD AUTO: 240 K/UL (ref 150–400)
PMV BLD AUTO: 9.1 FL (ref 8.9–12.9)
POTASSIUM SERPL-SCNC: 3.9 MMOL/L (ref 3.5–5.1)
PROT SERPL-MCNC: 7.6 G/DL (ref 6.4–8.2)
RBC # BLD AUTO: 4.59 M/UL (ref 4.1–5.7)
SODIUM SERPL-SCNC: 138 MMOL/L (ref 136–145)
SPECIMEN EXP DATE BLD: NORMAL
TRIGL SERPL-MCNC: 223 MG/DL (ref ?–150)
TSH SERPL DL<=0.05 MIU/L-ACNC: 0.79 UIU/ML (ref 0.36–3.74)
VLDLC SERPL CALC-MCNC: 44.6 MG/DL
WBC # BLD AUTO: 2.9 K/UL (ref 4.1–11.1)

## 2022-05-03 PROCEDURE — G8432 DEP SCR NOT DOC, RNG: HCPCS | Performed by: STUDENT IN AN ORGANIZED HEALTH CARE EDUCATION/TRAINING PROGRAM

## 2022-05-03 PROCEDURE — G8427 DOCREV CUR MEDS BY ELIG CLIN: HCPCS | Performed by: STUDENT IN AN ORGANIZED HEALTH CARE EDUCATION/TRAINING PROGRAM

## 2022-05-03 PROCEDURE — 2022F DILAT RTA XM EVC RTNOPTHY: CPT | Performed by: STUDENT IN AN ORGANIZED HEALTH CARE EDUCATION/TRAINING PROGRAM

## 2022-05-03 PROCEDURE — 3017F COLORECTAL CA SCREEN DOC REV: CPT | Performed by: STUDENT IN AN ORGANIZED HEALTH CARE EDUCATION/TRAINING PROGRAM

## 2022-05-03 PROCEDURE — 99213 OFFICE O/P EST LOW 20 MIN: CPT | Performed by: STUDENT IN AN ORGANIZED HEALTH CARE EDUCATION/TRAINING PROGRAM

## 2022-05-03 PROCEDURE — G8417 CALC BMI ABV UP PARAM F/U: HCPCS | Performed by: STUDENT IN AN ORGANIZED HEALTH CARE EDUCATION/TRAINING PROGRAM

## 2022-05-03 PROCEDURE — 3046F HEMOGLOBIN A1C LEVEL >9.0%: CPT | Performed by: STUDENT IN AN ORGANIZED HEALTH CARE EDUCATION/TRAINING PROGRAM

## 2022-05-03 NOTE — PROGRESS NOTES
[facility] [address] [place] [address]   Office [phone], Fax [phone]    Subjective:     Chief Complaint   Patient presents with    Extremity Weakness     Patient having leg weakness, muscle loss and would like to discuss possible low T     History provided by patient     Dileep Nowak. is a 57 y.o. male presents to discuss R leg weakness, fatigue. He would like his blood type checked and to be checked for low testosterone. Fatigue  - Present for years  - + Associated decreased libido   - Never been diagnosed with low testosterone or thyroid issues     R leg weakness/heaviness  - Down the back/side of his R leg  - Occurs with activity, relieved with rest and leaning forward  - Saw Sports medicine 8/2021 and has known degenerative disease in his back. He has an MRI scheduled for this coming Monday to further evaluate  - Has done PT previously which only helped minimally, he is doing home exercises only at this time. Also using a massage gun with relief of pain     Type 2 DM: Most recent A1C 6.3 8/2021  - No current medications, does not check his blood sugar  - Working on healthy diet/exercise       Medication reviewed. Current Outpatient Medications:     tadalafiL (Cialis) 20 mg tablet, Take 1 Tablet by mouth as needed for Erectile Dysfunction. Indications: the inability to have an erection, Disp: 30 Tablet, Rfl: 1    omeprazole (PRILOSEC) 40 mg capsule, Take 1 Capsule by mouth daily. , Disp: 90 Capsule, Rfl: 3    tiotropium-olodateroL (Stiolto Respimat) 2.5-2.5 mcg/actuation inhaler, Take 2 Puffs by inhalation daily. , Disp: 3 Inhaler, Rfl: 3    azelastine-fluticasone (Dymista) 137-50 mcg/spray spry, 1 [place] by Both Nostrils route two (2) times a day. (Patient not taking: Reported on 8/30/2021), Disp: 23 g, Rfl: 3     Allergy reviewed.   No Known Allergies     Past Medical History:   Diagnosis Date    AR (allergic rhinitis)     Arthritis     [facility]     Diabetes ([facility]     ED (erectile dysfunction)     GERD (gastroesophageal reflux disease)     History of stress test 5/5/15    pt states results were normal    Hyperlipidemia LDL goal < 100     Sarcoidosis 2015    Skin tag(s)     Tinea pedis        Social History     Socioeconomic History    Marital status:    Tobacco Use    Smoking status: Former Smoker     Packs/day: 3.00     Years: 30.00     Pack years: 90.00     Types: Cigarettes     Quit date: 2009     Years since quittin.3    Smokeless tobacco: Former User     Quit date:    Substance and Sexual Activity    Alcohol use: Yes     Alcohol/week: 6.0 standard drinks     Types: 2 Standard drinks or equivalent, 1 Cans of beer, 3 Shots of liquor per week    Drug use: No     Comment: history of not currently     Sexual activity: Not Currently       Review of Systems   Constitutional: Negative for chills and fever. Respiratory: Negative for cough and shortness of breath. Cardiovascular: Negative for chest pain and palpitations. Gastrointestinal: Negative for abdominal pain, diarrhea and nausea. Musculoskeletal: Negative for myalgias. + R Leg pain/weakness   Skin: Negative for rash. Neurological: Negative for dizziness and headaches. Objective:   Vitals - reviewed  Visit Vitals  /72 (BP 1 Location: Right arm, BP Patient Position: Sitting, BP Cuff Size: Large adult)   Pulse 74   Temp 97.3 °F (36.3 °C) (Temporal)   Resp 18   Ht 5' 11\" (1.803 m)   Wt 277 lb (125.6 kg)   SpO2 97%   BMI 38.63 kg/m²        Physical exam:   GEN: NAD. Alert. Well nourished. EYES:  Conjunctiva clear  NECK:  Supple; no masses; thyroid normal           LUNGS: Respirations unlabored; CTAB. no wheeze, rales, rhonchi   CARDIOVASCULAR: Regular, rate, and rhythm without murmurs, gallops or rubs   ABDOMEN: Soft; NT, ND. +bowel sounds; no rebound tenderness, no guarding.  no masses or organomegaly  NEUROLOGIC:  No focal neurologic deficits. Strength and sensation grossly intact. MSK: FROM in all extremities (both passive and active). Good tone. Posture and gait normal. 5/5 strength LE b/l, normal sensation b/l   EXT: Well perfused. No edema. No erythema. PSYCH: appropriate mood and affect. Good insight and judgement. Cooperative. SKIN: No obvious rashes or lesions. Assessment and orders:       ICD-10-CM ICD-9-CM    1. Chronic fatigue  R53.82 780.79 TSH 3RD GENERATION      TESTOSTERONE, FREE & TOTAL      CBC WITH AUTOMATED DIFF      CBC WITH AUTOMATED DIFF      TESTOSTERONE, FREE & TOTAL      TSH 3RD GENERATION   2. Type 2 diabetes mellitus with hyperglycemia, without long-term current use of insulin (HCC)  E11.65 250.00 HEMOGLOBIN A1C WITH EAG     790.29 CBC WITH AUTOMATED DIFF      METABOLIC PANEL, COMPREHENSIVE      MICROALBUMIN, UR, RAND W/ MICROALB/CREAT RATIO      METABOLIC PANEL, COMPREHENSIVE      CBC WITH AUTOMATED DIFF      HEMOGLOBIN A1C WITH EAG   3. Erectile dysfunction, unspecified erectile dysfunction type  N52.9 607.84 TESTOSTERONE, FREE & TOTAL      TESTOSTERONE, FREE & TOTAL   4. Encounter for lipid screening for cardiovascular disease  Z13.220 V77.91 LIPID PANEL    Z13.6 V81.2 LIPID PANEL   5. Encounter for blood typing  Z01.83 V72.86 TYPE & SCREEN      TYPE & SCREEN     Fatigue  - TSH, testosterone, CBC   - If labs normal- may consider PHQ9 at follow up    R leg/back pain: Likely foraminal vs spinal stenosis vs claudication   - Has MRI scheduled next week. ABIs were recommended but do not appear to be ordered. If MRI normal will proceed with ABIs  - Follow up with Sports Medicine clinic     Type 2 DM   - Labs as above  - Counseled on diet/exercise      Follow up in 1-2 weeks for annual physical     Pt was discussed with Dr Tobin Lizarraga (attending physician). I have reviewed patient medical and social history and medications. I have reviewed pertinent labs results and other data.  I have discussed the diagnosis with the patient and the intended plan as seen in the above orders. The patient has received an after-visit summary and questions were answered concerning future plans. I have discussed medication side effects and warnings with the patient as well.     Kerry Lambert,   Resident [facility]  05/03/22

## 2022-05-03 NOTE — PROGRESS NOTES
Identified Patient with two Patient identifiers (Name and ). Two Patient Identifiers confirmed. Reviewed record in preparation for visit and have obtained necessary documentation. Chief Complaint   Patient presents with    Extremity Weakness     Patient having leg weakness, muscle loss and would like to discuss possible low T       Visit Vitals  /72 (BP 1 Location: Right arm, BP Patient Position: Sitting, BP Cuff Size: Large adult)   Pulse 74   Temp 97.3 °F (36.3 °C) (Temporal)   Resp 18   Ht 5' 11\" (1.803 m)   Wt 277 lb (125.6 kg)   SpO2 97%   BMI 38.63 kg/m²       1. Have you been to the ER, urgent care clinic since your last visit? Hospitalized since your last visit? No    2. Have you seen or consulted any other health care providers outside of the [address] [place] [address] since your last visit? Include any pap smears or colon screening.  No

## 2022-05-04 DIAGNOSIS — D70.9 NEUTROPENIA, UNSPECIFIED TYPE (HCC): Primary | ICD-10-CM

## 2022-05-04 NOTE — PROGRESS NOTES
A + blood type, CMP, TSH ok  CBC with neutropenia (mild, abs neutrophils 1.1). No current infection. WBC minimally decreased over the last three blood draws. Will get peripheral smear, HIV, hepatitis panel, and folate/B12 for further eval to start  Lipid panel with elevated LDL, tchol, and TG. ASCVD 13%. Patient previously had side effects of his vision related to statins so will work on diet/exercise only for now  A1C 6.4, lifestyle modifications discussed again    Called patient to discuss results. He is agreeable to plan above.

## 2022-05-05 LAB
TESTOST FREE SERPL-MCNC: 6.7 PG/ML (ref 6.6–18.1)
TESTOST SERPL-MCNC: 277 NG/DL (ref 264–916)

## 2022-05-09 ENCOUNTER — LAB ONLY (OUTPATIENT)
Dept: FAMILY MEDICINE CLINIC | Age: 62
End: 2022-05-09

## 2022-05-09 ENCOUNTER — HOSPITAL ENCOUNTER (OUTPATIENT)
Dept: MRI IMAGING | Age: 62
Discharge: HOME | End: 2022-05-09
Attending: FAMILY MEDICINE
Payer: MEDICAID

## 2022-05-09 DIAGNOSIS — D70.9 NEUTROPENIA, UNSPECIFIED TYPE (HCC): ICD-10-CM

## 2022-05-09 DIAGNOSIS — M54.41 RIGHT-SIDED LOW BACK PAIN WITH RIGHT-SIDED SCIATICA, UNSPECIFIED CHRONICITY: ICD-10-CM

## 2022-05-09 PROCEDURE — 72148 MRI LUMBAR SPINE W/O DYE: CPT

## 2022-05-10 LAB
FOLATE SERPL-MCNC: 8 NG/ML (ref 5–21)
HAV IGM SER QL: NONREACTIVE
HBV CORE IGM SER QL: NONREACTIVE
HBV SURFACE AG SER QL: <0.1 INDEX
HBV SURFACE AG SER QL: NEGATIVE
HCV AB SERPL QL IA: NONREACTIVE
HIV 1+2 AB+HIV1 P24 AG SERPL QL IA: NONREACTIVE
HIV12 RESULT COMMENT, HHIVC: NORMAL
PERIPHERAL SMEAR,PSM: NORMAL
SP1: NORMAL
SP2: NORMAL
SP3: NORMAL
VIT B12 SERPL-MCNC: 141 PG/ML (ref 193–986)

## 2022-05-12 DIAGNOSIS — E53.8 VITAMIN B12 DEFICIENCY: Primary | ICD-10-CM

## 2022-05-12 DIAGNOSIS — D70.9 NEUTROPENIA, UNSPECIFIED TYPE (HCC): ICD-10-CM

## 2022-05-12 PROBLEM — E51.9 VITAMIN B1 DEFICIENCY: Status: ACTIVE | Noted: 2022-05-12

## 2022-05-12 RX ORDER — CYANOCOBALAMIN (VITAMIN B-12) 2000 MCG
2000 TABLET ORAL DAILY
Qty: 30 TABLET | Refills: 2 | Status: SHIPPED | OUTPATIENT
Start: 2022-05-12 | End: 2022-05-19

## 2022-05-12 NOTE — PROGRESS NOTES
HIV, hep panel NR  Folate wnl  Vit B12 low at 141- may be the cause of mild neutropenia. Will start replacement orally and recheck CBC and VitB12 in 4-6 weeks.

## 2022-05-12 NOTE — PROGRESS NOTES
Periph smear with mild leukocytopenia with neutropenia, otherwise normal, no blasts, no schistocytes     Called patient to discuss results. Awaiting HIV, hep panel, B12/folate.  Discussed with him may still refer to heme/onc based on results

## 2022-05-13 DIAGNOSIS — N52.9 ERECTILE DYSFUNCTION, UNSPECIFIED ERECTILE DYSFUNCTION TYPE: ICD-10-CM

## 2022-05-14 RX ORDER — TADALAFIL 20 MG/1
TABLET ORAL
Qty: 30 TABLET | Refills: 0 | Status: SHIPPED | OUTPATIENT
Start: 2022-05-14 | End: 2022-06-16

## 2022-05-19 ENCOUNTER — TELEPHONE (OUTPATIENT)
Dept: ONCOLOGY | Age: 62
End: 2022-05-19

## 2022-05-19 ENCOUNTER — OFFICE VISIT (OUTPATIENT)
Dept: FAMILY MEDICINE CLINIC | Age: 62
End: 2022-05-19
Payer: MEDICAID

## 2022-05-19 VITALS
WEIGHT: 276 LBS | HEART RATE: 68 BPM | SYSTOLIC BLOOD PRESSURE: 128 MMHG | TEMPERATURE: 98.2 F | OXYGEN SATURATION: 98 % | DIASTOLIC BLOOD PRESSURE: 68 MMHG | BODY MASS INDEX: 38.64 KG/M2 | HEIGHT: 71 IN | RESPIRATION RATE: 17 BRPM

## 2022-05-19 DIAGNOSIS — Z12.2 SCREENING FOR LUNG CANCER: ICD-10-CM

## 2022-05-19 DIAGNOSIS — D70.9 NEUTROPENIA, UNSPECIFIED TYPE (HCC): ICD-10-CM

## 2022-05-19 DIAGNOSIS — Z00.00 HEALTHCARE MAINTENANCE: Primary | ICD-10-CM

## 2022-05-19 DIAGNOSIS — M48.061 LUMBAR FORAMINAL STENOSIS: ICD-10-CM

## 2022-05-19 DIAGNOSIS — E53.8 VITAMIN B12 DEFICIENCY: ICD-10-CM

## 2022-05-19 DIAGNOSIS — Z12.11 COLON CANCER SCREENING: ICD-10-CM

## 2022-05-19 DIAGNOSIS — Z87.891 HISTORY OF TOBACCO ABUSE: ICD-10-CM

## 2022-05-19 DIAGNOSIS — Z80.42 FAMILY HISTORY OF PROSTATE CANCER: ICD-10-CM

## 2022-05-19 PROBLEM — E51.9 VITAMIN B1 DEFICIENCY: Status: RESOLVED | Noted: 2022-05-12 | Resolved: 2022-05-19

## 2022-05-19 PROCEDURE — 99214 OFFICE O/P EST MOD 30 MIN: CPT | Performed by: STUDENT IN AN ORGANIZED HEALTH CARE EDUCATION/TRAINING PROGRAM

## 2022-05-19 PROCEDURE — 99396 PREV VISIT EST AGE 40-64: CPT | Performed by: STUDENT IN AN ORGANIZED HEALTH CARE EDUCATION/TRAINING PROGRAM

## 2022-05-19 RX ORDER — CYANOCOBALAMIN 1000 UG/ML
1000 INJECTION, SOLUTION INTRAMUSCULAR; SUBCUTANEOUS ONCE
Status: COMPLETED | OUTPATIENT
Start: 2022-05-19 | End: 2022-05-19

## 2022-05-19 RX ADMIN — CYANOCOBALAMIN 1000 MCG: 1000 INJECTION, SOLUTION INTRAMUSCULAR; SUBCUTANEOUS at 15:26

## 2022-05-19 NOTE — PROGRESS NOTES
[address] [place] [address]   Office [phone], Fax [phone]    Subjective:     Chief Complaint   Patient presents with    Physical    Shoulder Pain     Right shoulder pain, going on for years. . Patient states no injury. Shoulder hurts when lifting. History of bone spurs and arthritis. History provided by patient     Lulu Goel. is a 57 y.o. male with PMHx of ED, type 2 DM, NACHO, HLD, vit B 12 def presents for annual physical.     Social  Diet: Trying to eat healthy with his wife, lots of vegetables and lean protein  Exercise: no formal exercise due to leg pain, about to join a gym though   Tobacco: Former smoker, quit 2009, smoked for 1-3ppd 30 years   Alcohol: occasionally, 4 liquor drinks at a time about twice per month   Illicit drug use: medical marijuana ocasioanlly     HM  Colonoscopy:  Last one 5 years ago, 1 polyp was removed and he was told to return in 5 years  Lung cancer screening: never had  Shingles: Declined  Pneumonia: Has had it       Medication reviewed. Current Outpatient Medications:     tadalafiL (CIALIS) 20 mg tablet, TAKE 1 TABLET BY MOUTH AS NEEDED, Disp: 30 Tablet, Rfl: 0    omeprazole (PRILOSEC) 40 mg capsule, Take 1 Capsule by mouth daily. , Disp: 90 Capsule, Rfl: 3    tiotropium-olodateroL (Stiolto Respimat) 2.5-2.5 mcg/actuation inhaler, Take 2 Puffs by inhalation daily. , Disp: 3 Inhaler, Rfl: 3    Current Facility-Administered Medications:     cyanocobalamin (VITAMIN B12) injection 1,000 mcg, 1,000 mcg, IntraMUSCular, ONCE, Gamaliel Ramos DO     Allergy reviewed.   No Known Allergies     Past Medical History:   Diagnosis Date    AR (allergic rhinitis)     Arthritis     COPD (chronic obstructive pulmonary disease) ([facility]     Diabetes ([facility]     ED (erectile dysfunction)     GERD (gastroesophageal reflux disease)     History of stress test 5/5/15    pt states results were normal    Skin tag(s)     Tinea pedis        Social History     Socioeconomic History    Marital status:    Tobacco Use    Smoking status: Former Smoker     Packs/day: 3.00     Years: 30.00     Pack years: 90.00     Types: Cigarettes     Quit date: 2009     Years since quittin.3    Smokeless tobacco: Former User     Quit date:    Substance and Sexual Activity    Alcohol use: Yes     Alcohol/week: 6.0 standard drinks     Types: 2 Standard drinks or equivalent, 1 Cans of beer, 3 Shots of liquor per week    Drug use: No     Comment: history of not currently     Sexual activity: Not Currently     Review of Systems   Constitutional: Negative for chills and fever. HENT: Negative for congestion and sore throat. Respiratory: Negative for cough and shortness of breath. Cardiovascular: Negative for chest pain and palpitations. Gastrointestinal: Negative for abdominal pain, nausea and vomiting. Genitourinary: Negative for dysuria. Musculoskeletal: Negative for myalgias. Skin: Negative for rash. Neurological: Negative for dizziness and headaches. Objective:   Vitals - reviewed  Visit Vitals  /68 (BP 1 Location: Right arm, BP Patient Position: Sitting, BP Cuff Size: Adult)   Pulse 68   Temp 98.2 °F (36.8 °C) (Oral)   Resp 17   Ht 5' 11\" (1.803 m)   Wt 276 lb (125.2 kg)   SpO2 98%   BMI 38.49 kg/m²      Physical exam:   GEN: NAD. Alert. Well nourished. EYES:  Conjunctiva clear     LUNGS: Respirations unlabored; CTAB. no wheeze, rales, rhonchi   CARDIOVASCULAR: Regular, rate, and rhythm without murmurs, gallops or rubs   ABDOMEN: Soft; NT, ND. +bowel sounds; no rebound tenderness, no guarding. no masses or organomegaly  NEUROLOGIC:  No focal neurologic deficits. Strength and sensation grossly intact. MSK: FROM in all extremities (both passive and active). Good tone. Posture and gait normal. 5/5 strength LE b/l, normal sensation b/l   EXT: Well perfused. No edema. No erythema. PSYCH: appropriate mood and affect.  Good insight and judgement. Cooperative. SKIN: No obvious rashes or lesions. Assessment and orders:       ICD-10-CM ICD-9-CM    1. Healthcare maintenance  Z00.00 V70.0    2. Lumbar foraminal stenosis  M48.061 724.02 REFERRAL TO ORTHOPEDICS   3. Neutropenia, unspecified type ([facility]  D70.9 288.00 REFERRAL TO HEMATOLOGY ONCOLOGY   4. Vitamin B12 deficiency  E53.8 266.2 REFERRAL TO HEMATOLOGY ONCOLOGY      INTRINSIC FACTOR AB   5. Colon cancer screening  Z12.11 V76.51 REFERRAL TO GASTROENTEROLOGY   6. History of tobacco abuse  Z87.891 V15.82 CT LOW DOSE LUNG CANCER SCREENING   7. Family history of prostate cancer  Z80.42 V16.42 PSA W/ REFLX FREE PSA   8. Screening for lung cancer  Z12.2 V76.0 CT LOW DOSE LUNG CANCER [address] on diet/exercise   - Labs previously ordered and reviewed  - Will add PSA to next lab draw as patients father had prostate cancer. Discussed risks vs benefits of testing and he agreed to proceed   - Low dose lung CT   - Referral to GI for colonoscopy     Vit B12 def/neutropenia  - Start Vit B12 IM injections once daily for 7 days and then weekly for 7 weeks  - Referral to Dr. Sid Carney, heme/onc  - Will add intrinsic factor Ab to next lab draw     Lumbar foraminal stenosis  - Referral to Dr. Pedro Salazar         Pt was discussed with Dr Keyanna Olivera (attending physician). I have reviewed patient medical and social history and medications. I have reviewed pertinent labs results and other data. I have discussed the diagnosis with the patient and the intended plan as seen in the above orders. The patient has received an after-visit summary and questions were answered concerning future plans. I have discussed medication side effects and warnings with the patient as well.     Giuliana Sullivan DO  Resident [facility]  05/19/22

## 2022-05-19 NOTE — TELEPHONE ENCOUNTER
Called patient to set up NP appt. No answer.  Left vm    NP / Neutrop[facility], unspecified type ([facility] / Ayush Esteves

## 2022-05-19 NOTE — PROGRESS NOTES
Chief Complaint   Patient presents with    Physical    Shoulder Pain     Right shoulder pain, going on for years. . Patient states no injury. Shoulder hurts when lifting. History of bone spurs and arthritis. Visit Vitals  /68 (BP 1 Location: Right arm, BP Patient Position: Sitting, BP Cuff Size: Adult)   Pulse 68   Temp 98.2 °F (36.8 °C) (Oral)   Resp 17   Ht 5' 11\" (1.803 m)   Wt 276 lb (125.2 kg)   SpO2 98%   BMI 38.49 kg/m²     1. Have you been to the ER, urgent care clinic since your last visit? Hospitalized since your last visit? No    2. Have you seen or consulted any other health care providers outside of the [address] [place] [address] since your last visit? Include any pap smears or colon screening. NO    Patient received Vit B12  IM in right arm.

## 2022-05-19 NOTE — PATIENT INSTRUCTIONS
*Take the vitamin B12 injections once a day for 1 week and then once per week for 7 weeks*      Vitamin B12 Deficiency: Care Instructions  Overview    A vitamin B12 deficiency means that your body doesn't have enough of this vitamin. You need vitamin B12 to keep red blood cells and nerve cells healthy. Not enough B12 can cause anemia. It can also damage nerves and cause trouble with memory and thinking. Many things can cause low levels of vitamin B12. They include:  · Not getting enough of this vitamin through food. · An autoimmune problem, like pernicious anemia. · Weight-loss surgery, like gastric bypass. · Long-term use of heartburn medicines. Low levels of B12 may not cause symptoms. But symptoms may include fatigue, depression, and thinking or memory problems. You may have tingling in your hands or feet and changes in the way you walk. Treatment depends on the reason for low vitamin B12. Eating more foods rich in B12 may be enough. Or you might take the vitamin as a pill, as shots, or as nasal spray. How can you care for yourself? · Take vitamin B12 as your doctor recommends. · Go to your appointments if you are getting B12 shots. · Eat more foods rich in vitamin B12. Examples are:  ? Animal products. These include meat, seafood, milk products, poultry, and eggs. ? Foods that have B12 added. These are called fortified foods. They include soy products, nutritional yeast, and dry cereals. · Work with a nutritionist or dietitian if you need help getting more vitamin B12 from food. · Talk to your doctor about stopping medicines if they are adding to your B12 deficiency. When should you call for help? Call 911  anytime you think you may need emergency care. For example, call if:    · You passed out (lost consciousness). Call your doctor now or seek immediate medical care if:    · You are dizzy or lightheaded, or you feel like you may faint. Watch closely for changes in your health.  Be sure to call your doctor if:    · You are confused or can't think clearly.     · You don't get better as expected. Where can you learn more? Go to http://www.gray.com/  Enter B352 in the search box to learn more about \"Vitamin B12 Deficiency: Care Instructions. \"  Current as of: November 29, 2021               Content Version: 13.2  © 3756-2391 [employer]. Care instructions adapted under license by [employer] (which disclaims liability or warranty for this information). If you have questions about a medical condition or this instruction, always ask your healthcare professional. [place] [address] any warranty or liability for your use of this information.

## 2022-05-20 ENCOUNTER — CLINICAL SUPPORT (OUTPATIENT)
Dept: FAMILY MEDICINE CLINIC | Age: 62
End: 2022-05-20
Payer: MEDICAID

## 2022-05-20 VITALS
DIASTOLIC BLOOD PRESSURE: 72 MMHG | BODY MASS INDEX: 38.92 KG/M2 | WEIGHT: 278 LBS | SYSTOLIC BLOOD PRESSURE: 147 MMHG | OXYGEN SATURATION: 95 % | HEART RATE: 83 BPM | HEIGHT: 71 IN | TEMPERATURE: 98.3 F | RESPIRATION RATE: 18 BRPM

## 2022-05-20 DIAGNOSIS — E53.8 VITAMIN B12 DEFICIENCY: Primary | ICD-10-CM

## 2022-05-20 PROBLEM — M48.061 LUMBAR FORAMINAL STENOSIS: Status: ACTIVE | Noted: 2022-05-20

## 2022-05-20 PROCEDURE — 96372 THER/PROPH/DIAG INJ SC/IM: CPT | Performed by: FAMILY MEDICINE

## 2022-05-20 RX ORDER — CYANOCOBALAMIN 1000 UG/ML
1000 INJECTION, SOLUTION INTRAMUSCULAR; SUBCUTANEOUS ONCE
Status: COMPLETED | OUTPATIENT
Start: 2022-05-20 | End: 2022-05-20

## 2022-05-20 RX ADMIN — CYANOCOBALAMIN 1000 MCG: 1000 INJECTION, SOLUTION INTRAMUSCULAR; SUBCUTANEOUS at 15:55

## 2022-05-20 NOTE — PROGRESS NOTES
Chief Complaint   Patient presents with    Injection B12     per dr Michelle Jeffery:    05/20/22 1551 05/20/22 1554   BP: (!) 162/86 (!) 147/72   BP 1 Location: Left upper arm Left upper arm   BP Patient Position: Sitting Sitting   BP Cuff Size: Large adult Large adult   Pulse: 83    Resp: 18    Height: 5' 11\" (1.803 m)    Weight: 278 lb (126.1 kg)    SpO2: 95%          Vitamin B12 given today, by Vashti Angel with patient's consent. No reactions noted. Advised pt to return for nurse visit.

## 2022-05-23 ENCOUNTER — CLINICAL SUPPORT (OUTPATIENT)
Dept: FAMILY MEDICINE CLINIC | Age: 62
End: 2022-05-23
Payer: MEDICAID

## 2022-05-23 DIAGNOSIS — E53.8 VITAMIN B12 DEFICIENCY: Primary | ICD-10-CM

## 2022-05-23 PROCEDURE — 96372 THER/PROPH/DIAG INJ SC/IM: CPT | Performed by: FAMILY MEDICINE

## 2022-05-23 RX ORDER — CYANOCOBALAMIN 1000 UG/ML
1000 INJECTION, SOLUTION INTRAMUSCULAR; SUBCUTANEOUS ONCE
Status: COMPLETED | OUTPATIENT
Start: 2022-05-23 | End: 2022-05-23

## 2022-05-23 RX ADMIN — CYANOCOBALAMIN 1000 MCG: 1000 INJECTION, SOLUTION INTRAMUSCULAR; SUBCUTANEOUS at 17:08

## 2022-05-23 NOTE — PROGRESS NOTES
Chief Complaint   Patient presents with    Injection B12     Vitamin B12 Immunization given today, by Tad Chris with patient's consent. No reactions noted. Advised pt to return for nurse visit.

## 2022-05-24 ENCOUNTER — CLINICAL SUPPORT (OUTPATIENT)
Dept: FAMILY MEDICINE CLINIC | Age: 62
End: 2022-05-24
Payer: MEDICAID

## 2022-05-24 VITALS — TEMPERATURE: 97.1 F

## 2022-05-24 DIAGNOSIS — E53.8 VITAMIN B12 DEFICIENCY: Primary | ICD-10-CM

## 2022-05-24 PROCEDURE — 96372 THER/PROPH/DIAG INJ SC/IM: CPT | Performed by: FAMILY MEDICINE

## 2022-05-24 RX ORDER — CYANOCOBALAMIN 1000 UG/ML
1000 INJECTION, SOLUTION INTRAMUSCULAR; SUBCUTANEOUS ONCE
Status: COMPLETED | OUTPATIENT
Start: 2022-05-24 | End: 2022-05-24

## 2022-05-24 RX ADMIN — CYANOCOBALAMIN 1000 MCG: 1000 INJECTION, SOLUTION INTRAMUSCULAR; SUBCUTANEOUS at 16:53

## 2022-05-25 ENCOUNTER — CLINICAL SUPPORT (OUTPATIENT)
Dept: FAMILY MEDICINE CLINIC | Age: 62
End: 2022-05-25
Payer: MEDICAID

## 2022-05-25 DIAGNOSIS — E53.8 VITAMIN B12 DEFICIENCY: Primary | ICD-10-CM

## 2022-05-25 PROCEDURE — 96372 THER/PROPH/DIAG INJ SC/IM: CPT | Performed by: FAMILY MEDICINE

## 2022-05-25 RX ORDER — CYANOCOBALAMIN 1000 UG/ML
1000 INJECTION, SOLUTION INTRAMUSCULAR; SUBCUTANEOUS ONCE
Status: COMPLETED | OUTPATIENT
Start: 2022-05-25 | End: 2022-05-25

## 2022-05-25 RX ADMIN — CYANOCOBALAMIN 1000 MCG: 1000 INJECTION, SOLUTION INTRAMUSCULAR; SUBCUTANEOUS at 11:03

## 2022-05-26 ENCOUNTER — CLINICAL SUPPORT (OUTPATIENT)
Dept: FAMILY MEDICINE CLINIC | Age: 62
End: 2022-05-26
Payer: MEDICAID

## 2022-05-26 VITALS
HEART RATE: 83 BPM | TEMPERATURE: 97.1 F | OXYGEN SATURATION: 95 % | WEIGHT: 278 LBS | HEIGHT: 71 IN | RESPIRATION RATE: 17 BRPM | DIASTOLIC BLOOD PRESSURE: 72 MMHG | SYSTOLIC BLOOD PRESSURE: 147 MMHG | BODY MASS INDEX: 38.92 KG/M2

## 2022-05-26 DIAGNOSIS — E53.8 VITAMIN B12 DEFICIENCY: Primary | ICD-10-CM

## 2022-05-26 PROCEDURE — 96372 THER/PROPH/DIAG INJ SC/IM: CPT | Performed by: FAMILY MEDICINE

## 2022-05-26 RX ORDER — CYANOCOBALAMIN 1000 UG/ML
1000 INJECTION, SOLUTION INTRAMUSCULAR; SUBCUTANEOUS ONCE
Status: COMPLETED | OUTPATIENT
Start: 2022-05-26 | End: 2022-05-26

## 2022-05-26 RX ADMIN — CYANOCOBALAMIN 1000 MCG: 1000 INJECTION, SOLUTION INTRAMUSCULAR; SUBCUTANEOUS at 15:32

## 2022-05-26 NOTE — PROGRESS NOTES
Chief Complaint   Patient presents with    Injection B12     1. Have you been to the ER, urgent care clinic since your last visit? Hospitalized since your last visit? No    2. Have you seen or consulted any other health care providers outside of the [address] [place] [address] since your last visit? Include any pap smears or colon screening.  No

## 2022-05-27 ENCOUNTER — CLINICAL SUPPORT (OUTPATIENT)
Dept: FAMILY MEDICINE CLINIC | Age: 62
End: 2022-05-27
Payer: MEDICAID

## 2022-05-27 VITALS
RESPIRATION RATE: 17 BRPM | HEIGHT: 71 IN | TEMPERATURE: 97.3 F | SYSTOLIC BLOOD PRESSURE: 147 MMHG | DIASTOLIC BLOOD PRESSURE: 72 MMHG | OXYGEN SATURATION: 95 % | BODY MASS INDEX: 38.92 KG/M2 | WEIGHT: 278 LBS | HEART RATE: 83 BPM

## 2022-05-27 DIAGNOSIS — E53.8 VITAMIN B12 DEFICIENCY: Primary | ICD-10-CM

## 2022-05-27 PROCEDURE — 96372 THER/PROPH/DIAG INJ SC/IM: CPT | Performed by: FAMILY MEDICINE

## 2022-05-27 RX ORDER — CYANOCOBALAMIN 1000 UG/ML
1000 INJECTION, SOLUTION INTRAMUSCULAR; SUBCUTANEOUS ONCE
Status: COMPLETED | OUTPATIENT
Start: 2022-05-27 | End: 2022-05-27

## 2022-05-27 RX ADMIN — CYANOCOBALAMIN 1000 MCG: 1000 INJECTION, SOLUTION INTRAMUSCULAR; SUBCUTANEOUS at 15:38

## 2022-05-27 NOTE — PROGRESS NOTES
Chief Complaint   Patient presents with    Injection B12     1. Have you been to the ER, urgent care clinic since your last visit? Hospitalized since your last visit? No    2. Have you seen or consulted any other health care providers outside of the [address] [place] [address] since your last visit? Include any pap smears or colon screening.  No

## 2022-06-01 ENCOUNTER — HOSPITAL ENCOUNTER (OUTPATIENT)
Dept: CT IMAGING | Age: 62
Discharge: HOME | End: 2022-06-01
Attending: STUDENT IN AN ORGANIZED HEALTH CARE EDUCATION/TRAINING PROGRAM
Payer: MEDICAID

## 2022-06-01 ENCOUNTER — CLINICAL SUPPORT (OUTPATIENT)
Dept: FAMILY MEDICINE CLINIC | Age: 62
End: 2022-06-01
Payer: MEDICAID

## 2022-06-01 VITALS
HEIGHT: 71 IN | HEART RATE: 83 BPM | BODY MASS INDEX: 38.92 KG/M2 | RESPIRATION RATE: 17 BRPM | DIASTOLIC BLOOD PRESSURE: 62 MMHG | TEMPERATURE: 97.8 F | SYSTOLIC BLOOD PRESSURE: 144 MMHG | OXYGEN SATURATION: 97 % | WEIGHT: 278 LBS

## 2022-06-01 DIAGNOSIS — Z12.2 SCREENING FOR LUNG CANCER: ICD-10-CM

## 2022-06-01 DIAGNOSIS — E53.8 VITAMIN B12 DEFICIENCY: Primary | ICD-10-CM

## 2022-06-01 DIAGNOSIS — Z87.891 HISTORY OF TOBACCO ABUSE: ICD-10-CM

## 2022-06-01 PROCEDURE — 71271 CT THORAX LUNG CANCER SCR C-: CPT

## 2022-06-01 PROCEDURE — 96372 THER/PROPH/DIAG INJ SC/IM: CPT | Performed by: FAMILY MEDICINE

## 2022-06-01 RX ORDER — CYANOCOBALAMIN 1000 UG/ML
1000 INJECTION, SOLUTION INTRAMUSCULAR; SUBCUTANEOUS ONCE
Status: COMPLETED | OUTPATIENT
Start: 2022-06-01 | End: 2022-06-01

## 2022-06-01 RX ADMIN — CYANOCOBALAMIN 1000 MCG: 1000 INJECTION, SOLUTION INTRAMUSCULAR; SUBCUTANEOUS at 16:02

## 2022-06-01 NOTE — PROGRESS NOTES
Chief Complaint   Patient presents with    Injection B12     1. Have you been to the ER, urgent care clinic since your last visit? Hospitalized since your last visit? No    2. Have you seen or consulted any other health care providers outside of the [address] [place] [address] since your last visit? Include any pap smears or colon screening.  No

## 2022-06-06 DIAGNOSIS — R53.82 CHRONIC FATIGUE: Primary | ICD-10-CM

## 2022-06-08 ENCOUNTER — CLINICAL SUPPORT (OUTPATIENT)
Dept: FAMILY MEDICINE CLINIC | Age: 62
End: 2022-06-08
Payer: MEDICAID

## 2022-06-08 ENCOUNTER — TELEPHONE (OUTPATIENT)
Dept: FAMILY MEDICINE CLINIC | Age: 62
End: 2022-06-08

## 2022-06-08 VITALS
SYSTOLIC BLOOD PRESSURE: 155 MMHG | TEMPERATURE: 97.8 F | RESPIRATION RATE: 17 BRPM | BODY MASS INDEX: 38.92 KG/M2 | HEART RATE: 90 BPM | DIASTOLIC BLOOD PRESSURE: 74 MMHG | WEIGHT: 278 LBS | HEIGHT: 71 IN

## 2022-06-08 DIAGNOSIS — E53.8 VITAMIN B12 DEFICIENCY: Primary | ICD-10-CM

## 2022-06-08 PROCEDURE — 96372 THER/PROPH/DIAG INJ SC/IM: CPT | Performed by: FAMILY MEDICINE

## 2022-06-08 RX ORDER — CYANOCOBALAMIN 1000 UG/ML
1000 INJECTION, SOLUTION INTRAMUSCULAR; SUBCUTANEOUS ONCE
Status: COMPLETED | OUTPATIENT
Start: 2022-06-08 | End: 2022-06-08

## 2022-06-08 RX ADMIN — CYANOCOBALAMIN 1000 MCG: 1000 INJECTION, SOLUTION INTRAMUSCULAR; SUBCUTANEOUS at 15:38

## 2022-06-08 NOTE — PROGRESS NOTES
Chief Complaint   Patient presents with    Injection B12     1. Have you been to the ER, urgent care clinic since your last visit? Hospitalized since your last visit? No    2. Have you seen or consulted any other health care providers outside of the [address] [place] [address] since your last visit? Include any pap smears or colon screening. No    B/P 161/77 today then 155/74. Instructed pt to check B/P daily at home and bring log next week.

## 2022-06-15 ENCOUNTER — CLINICAL SUPPORT (OUTPATIENT)
Dept: FAMILY MEDICINE CLINIC | Age: 62
End: 2022-06-15
Payer: MEDICAID

## 2022-06-15 VITALS
HEIGHT: 71 IN | TEMPERATURE: 97.1 F | WEIGHT: 278 LBS | BODY MASS INDEX: 38.92 KG/M2 | HEART RATE: 77 BPM | RESPIRATION RATE: 17 BRPM | DIASTOLIC BLOOD PRESSURE: 65 MMHG | SYSTOLIC BLOOD PRESSURE: 136 MMHG | OXYGEN SATURATION: 97 %

## 2022-06-15 DIAGNOSIS — E53.8 VITAMIN B12 DEFICIENCY: Primary | ICD-10-CM

## 2022-06-15 PROCEDURE — 96372 THER/PROPH/DIAG INJ SC/IM: CPT | Performed by: FAMILY MEDICINE

## 2022-06-15 RX ORDER — CYANOCOBALAMIN 1000 UG/ML
1000 INJECTION, SOLUTION INTRAMUSCULAR; SUBCUTANEOUS ONCE
Status: COMPLETED | OUTPATIENT
Start: 2022-06-15 | End: 2022-06-15

## 2022-06-15 RX ADMIN — CYANOCOBALAMIN 1000 MCG: 1000 INJECTION, SOLUTION INTRAMUSCULAR; SUBCUTANEOUS at 15:45

## 2022-06-15 NOTE — TELEPHONE ENCOUNTER
Pt in today for nurse visit.   Brought b/p log.    6/10/22   143/66 am                 127/66 pm  6/11/22   137/63 pm  6/13/22   137/73 am                 124/60 pm  6/15/22   144/71 am                 136/65 pm

## 2022-06-15 NOTE — PROGRESS NOTES
Chief Complaint   Patient presents with    Injection B12     1. Have you been to the ER, urgent care clinic since your last visit? Hospitalized since your last visit? No    2. Have you seen or consulted any other health care providers outside of the [address] [place] [address] since your last visit? Include any pap smears or colon screening.  No

## 2022-06-16 DIAGNOSIS — N52.9 ERECTILE DYSFUNCTION, UNSPECIFIED ERECTILE DYSFUNCTION TYPE: ICD-10-CM

## 2022-06-16 RX ORDER — TADALAFIL 20 MG/1
TABLET ORAL
Qty: 30 TABLET | Refills: 0 | Status: SHIPPED | OUTPATIENT
Start: 2022-06-16 | End: 2022-07-18 | Stop reason: SDUPTHER

## 2022-06-22 ENCOUNTER — CLINICAL SUPPORT (OUTPATIENT)
Dept: FAMILY MEDICINE CLINIC | Age: 62
End: 2022-06-22
Payer: MEDICAID

## 2022-06-22 VITALS — TEMPERATURE: 97.8 F | BODY MASS INDEX: 38.63 KG/M2 | WEIGHT: 277 LBS

## 2022-06-22 DIAGNOSIS — E53.8 VITAMIN B 12 DEFICIENCY: Primary | ICD-10-CM

## 2022-06-22 PROCEDURE — 96372 THER/PROPH/DIAG INJ SC/IM: CPT | Performed by: FAMILY MEDICINE

## 2022-06-22 RX ORDER — CYANOCOBALAMIN 1000 UG/ML
1000 INJECTION, SOLUTION INTRAMUSCULAR; SUBCUTANEOUS
Status: COMPLETED | OUTPATIENT
Start: 2022-06-22 | End: 2022-06-22

## 2022-06-22 RX ORDER — CYANOCOBALAMIN 1000 UG/ML
1000 INJECTION, SOLUTION INTRAMUSCULAR; SUBCUTANEOUS ONCE
Qty: 1 ML | Refills: 0
Start: 2022-06-22 | End: 2022-06-22 | Stop reason: SDUPTHER

## 2022-06-22 RX ADMIN — CYANOCOBALAMIN 1000 MCG: 1000 INJECTION, SOLUTION INTRAMUSCULAR; SUBCUTANEOUS at 15:27

## 2022-06-29 ENCOUNTER — CLINICAL SUPPORT (OUTPATIENT)
Dept: FAMILY MEDICINE CLINIC | Age: 62
End: 2022-06-29
Payer: MEDICAID

## 2022-06-29 VITALS — OXYGEN SATURATION: 97 % | SYSTOLIC BLOOD PRESSURE: 145 MMHG | DIASTOLIC BLOOD PRESSURE: 71 MMHG | HEART RATE: 71 BPM

## 2022-06-29 DIAGNOSIS — E53.8 VITAMIN B 12 DEFICIENCY: Primary | ICD-10-CM

## 2022-06-29 PROCEDURE — 96372 THER/PROPH/DIAG INJ SC/IM: CPT | Performed by: FAMILY MEDICINE

## 2022-06-29 RX ORDER — CYANOCOBALAMIN 1000 UG/ML
1000 INJECTION, SOLUTION INTRAMUSCULAR; SUBCUTANEOUS ONCE
Status: COMPLETED | OUTPATIENT
Start: 2022-06-29 | End: 2022-06-29

## 2022-06-29 RX ADMIN — CYANOCOBALAMIN 1000 MCG: 1000 INJECTION, SOLUTION INTRAMUSCULAR; SUBCUTANEOUS at 15:28

## 2022-06-29 NOTE — PROGRESS NOTES
Chief Complaint   Patient presents with    Injection B12     Visit Vitals  BP (!) 145/71 (BP 1 Location: Left arm, BP Patient Position: Sitting, BP Cuff Size: Adult)   Pulse 71   SpO2 97%     Patient has been given B12 injection 1000 mcg IM left deltoid. Patient tolerated well. No concerns.

## 2022-07-06 ENCOUNTER — CLINICAL SUPPORT (OUTPATIENT)
Dept: FAMILY MEDICINE CLINIC | Age: 62
End: 2022-07-06
Payer: MEDICAID

## 2022-07-06 VITALS
WEIGHT: 277.8 LBS | DIASTOLIC BLOOD PRESSURE: 61 MMHG | HEART RATE: 81 BPM | RESPIRATION RATE: 17 BRPM | OXYGEN SATURATION: 94 % | HEIGHT: 71 IN | SYSTOLIC BLOOD PRESSURE: 142 MMHG | BODY MASS INDEX: 38.89 KG/M2

## 2022-07-06 DIAGNOSIS — E53.8 VITAMIN B 12 DEFICIENCY: Primary | ICD-10-CM

## 2022-07-06 PROCEDURE — 96372 THER/PROPH/DIAG INJ SC/IM: CPT | Performed by: FAMILY MEDICINE

## 2022-07-06 RX ORDER — CYANOCOBALAMIN 1000 UG/ML
1000 INJECTION, SOLUTION INTRAMUSCULAR; SUBCUTANEOUS ONCE
Status: COMPLETED | OUTPATIENT
Start: 2022-07-06 | End: 2022-07-06

## 2022-07-06 RX ADMIN — CYANOCOBALAMIN 1000 MCG: 1000 INJECTION, SOLUTION INTRAMUSCULAR; SUBCUTANEOUS at 15:29

## 2022-07-06 NOTE — PROGRESS NOTES
Chief Complaint   Patient presents with    Injection B12     Visit Vitals  BP (!) 142/61 (BP 1 Location: Left arm, BP Patient Position: Sitting, BP Cuff Size: Adult)   Pulse 81   Resp 17   Ht 5' 11\" (1.803 m)   Wt 277 lb 12.8 oz (126 kg)   SpO2 94%   BMI 38.75 kg/m²       Administered B12 injection 1,000 mcg in right deltoid. No band-aid, allergic to the adhesive. Patient tolerates well. No signs or symptoms of distress.

## 2022-07-13 ENCOUNTER — CLINICAL SUPPORT (OUTPATIENT)
Dept: FAMILY MEDICINE CLINIC | Age: 62
End: 2022-07-13
Payer: MEDICAID

## 2022-07-13 VITALS
HEIGHT: 71 IN | DIASTOLIC BLOOD PRESSURE: 67 MMHG | RESPIRATION RATE: 17 BRPM | WEIGHT: 277 LBS | TEMPERATURE: 97 F | OXYGEN SATURATION: 98 % | SYSTOLIC BLOOD PRESSURE: 157 MMHG | HEART RATE: 76 BPM | BODY MASS INDEX: 38.78 KG/M2

## 2022-07-13 DIAGNOSIS — E53.8 VITAMIN B 12 DEFICIENCY: Primary | ICD-10-CM

## 2022-07-13 PROCEDURE — 96372 THER/PROPH/DIAG INJ SC/IM: CPT | Performed by: FAMILY MEDICINE

## 2022-07-13 RX ORDER — CYANOCOBALAMIN 1000 UG/ML
1000 INJECTION, SOLUTION INTRAMUSCULAR; SUBCUTANEOUS ONCE
Status: COMPLETED | OUTPATIENT
Start: 2022-07-13 | End: 2022-07-13

## 2022-07-13 RX ADMIN — CYANOCOBALAMIN 1000 MCG: 1000 INJECTION, SOLUTION INTRAMUSCULAR; SUBCUTANEOUS at 14:28

## 2022-07-13 NOTE — PROGRESS NOTES
Chief Complaint   Patient presents with    Injection B12     1. Have you been to the ER, urgent care clinic since your last visit? Hospitalized since your last visit? No    2. Have you seen or consulted any other health care providers outside of the [address] [place] [address] since your last visit? Include any pap smears or colon screening.  No

## 2022-07-18 DIAGNOSIS — N52.9 ERECTILE DYSFUNCTION, UNSPECIFIED ERECTILE DYSFUNCTION TYPE: ICD-10-CM

## 2022-07-18 RX ORDER — TADALAFIL 20 MG/1
20 TABLET ORAL AS NEEDED
Qty: 30 TABLET | Refills: 1 | Status: SHIPPED | OUTPATIENT
Start: 2022-07-18

## 2022-07-20 ENCOUNTER — LAB ONLY (OUTPATIENT)
Dept: FAMILY MEDICINE CLINIC | Age: 62
End: 2022-07-20

## 2022-07-20 DIAGNOSIS — D70.9 NEUTROPENIA, UNSPECIFIED TYPE (HCC): ICD-10-CM

## 2022-07-20 DIAGNOSIS — Z80.42 FAMILY HISTORY OF PROSTATE CANCER: ICD-10-CM

## 2022-07-20 DIAGNOSIS — E53.8 VITAMIN B12 DEFICIENCY: ICD-10-CM

## 2022-07-20 DIAGNOSIS — R53.82 CHRONIC FATIGUE: ICD-10-CM

## 2022-07-21 LAB
BASOPHILS # BLD: 0 K/UL (ref 0–0.1)
BASOPHILS NFR BLD: 1 % (ref 0–1)
DIFFERENTIAL METHOD BLD: ABNORMAL
EOSINOPHIL # BLD: 0.1 K/UL (ref 0–0.4)
EOSINOPHIL NFR BLD: 2 % (ref 0–7)
ERYTHROCYTE [DISTWIDTH] IN BLOOD BY AUTOMATED COUNT: 14 % (ref 11.5–14.5)
HCT VFR BLD AUTO: 44.5 % (ref 36.6–50.3)
HGB BLD-MCNC: 14.4 G/DL (ref 12.1–17)
IMM GRANULOCYTES # BLD AUTO: 0 K/UL (ref 0–0.04)
IMM GRANULOCYTES NFR BLD AUTO: 1 % (ref 0–0.5)
LYMPHOCYTES # BLD: 1.4 K/UL (ref 0.8–3.5)
LYMPHOCYTES NFR BLD: 37 % (ref 12–49)
MCH RBC QN AUTO: 30.7 PG (ref 26–34)
MCHC RBC AUTO-ENTMCNC: 32.4 G/DL (ref 30–36.5)
MCV RBC AUTO: 94.9 FL (ref 80–99)
MONOCYTES # BLD: 0.5 K/UL (ref 0–1)
MONOCYTES NFR BLD: 14 % (ref 5–13)
NEUTS SEG # BLD: 1.7 K/UL (ref 1.8–8)
NEUTS SEG NFR BLD: 45 % (ref 32–75)
NRBC # BLD: 0 K/UL (ref 0–0.01)
NRBC BLD-RTO: 0 PER 100 WBC
PLATELET # BLD AUTO: 268 K/UL (ref 150–400)
PMV BLD AUTO: 9.2 FL (ref 8.9–12.9)
RBC # BLD AUTO: 4.69 M/UL (ref 4.1–5.7)
VIT B12 SERPL-MCNC: 871 PG/ML (ref 193–986)
WBC # BLD AUTO: 3.7 K/UL (ref 4.1–11.1)

## 2022-07-22 LAB
IF BLOCK AB SER-ACNC: 1 AU/ML (ref 0–1.1)
PSA SERPL-MCNC: 0.4 NG/ML (ref 0–4)
REFLEX CRITERIA: NORMAL

## 2022-07-23 LAB
TESTOST FREE SERPL-MCNC: 10.1 PG/ML (ref 6.6–18.1)
TESTOST SERPL-MCNC: 380 NG/DL (ref 264–916)

## 2022-07-26 NOTE — PROGRESS NOTES
[id number] [facility] at [facility]  [phone]    Hematology / Oncology Consult    Reason for Visit:   Anthony Gil is a 57 y.o. male who is seen in consultation at the request of Dr. Erin Concepcion for evaluation of neutropenia. History of Present Illness:   Anthony Gil is a 57 y.o. male with COPD, DM, GERD, Arthritis is seen for neutropenia. He was recently diagnosed with VitB12 deficiency and has been started on B12 injections. He has several questions regarding whether he can switch from B12 injections to oral medications. He has h/o small bowel resection in . States he had a COVID infection in 2021. No unintentional weight loss, fevers, chills, sweats. He also reports diagnosis of Sarcoidosis, based on prior lung biopsy. He uses Stialto injections for this and states his disease is under control. Sees Dr. Anita Lipscomb. No recurrent or serious infections.          Past Medical History:   Diagnosis Date    AR (allergic rhinitis)     Arthritis     COPD (chronic obstructive pulmonary disease) ([address]     Diabetes ([facility]     ED (erectile dysfunction)     GERD (gastroesophageal reflux disease)     History of stress test 5/5/15    pt states results were normal    Skin tag(s)     Tinea pedis       Past Surgical History:   Procedure Laterality Date    HX APPENDECTOMY      HX HEENT      sinus lifts    HX SMALL BOWEL RESECTION      scar tissue from appendectomy in  caused obstruction    MT CHEST SURGERY PROCEDURE UNLISTED      bronchoscopy May 2015 diagnosed sarcoidosis    MT EXC SKIN BENIG 2.1-3CM TRUNK,ARM,LEG  2011    seborrheic keratosis excised from right upper back and left forearm    MT UNS ORAL SURG PROC BY REPORT      dental implants      Social History     Tobacco Use    Smoking status: Former     Packs/day: 3.00     Years: 30.00     Pack years: 90.00     Types: Cigarettes     Quit date: 2009     Years since quittin.5    Smokeless tobacco: Former     Quit date: 2009   Substance Use Topics    Alcohol use: Yes     Alcohol/week: 6.0 standard drinks     Types: 2 Standard drinks or equivalent, 1 Cans of beer, 3 Shots of liquor per week      Family History   Problem Relation Age of Onset    Cancer Mother 50        breast    Cancer Father         prostate    Diabetes Father     Hypertension Father     Other Father         gout    Heart Disease Neg Hx      Current Outpatient Medications   Medication Sig    tadalafiL (CIALIS) 20 mg tablet Take 1 Tablet by mouth as needed for Erectile Dysfunction. omeprazole (PRILOSEC) 40 mg capsule Take 1 Capsule by mouth daily. tiotropium-olodateroL (Stiolto Respimat) 2.5-2.5 mcg/actuation inhaler Take 2 Puffs by inhalation daily. No current facility-administered medications for this visit. No Known Allergies     Review of Systems: A complete review of systems was obtained, negative except as described above. Physical Exam:   Blood pressure (!) 146/61, pulse 73, resp. rate 20, height 5' 11\" (1.803 m), weight 280 lb (127 kg), SpO2 98 %. ECOG PS: 0  General: Well developed, no acute distress, obese  Eyes: PERRLA, EOMI, anicteric sclerae  HENT: Atraumatic, OP clear  Neck: Supple, no JVD or thyromegaly  Lymphatic: No cervical, supraclavicular, axillary adenopathy  Respiratory: CTAB, normal respiratory effort  CV: Normal rate, regular rhythm, no murmurs, no peripheral edema  GI: Soft, nontender, nondistended, no masses, no hepatomegaly, no splenomegaly  MS: Normal gait and station. Digits without clubbing or cyanosis. Skin: No rashes, ecchymoses, or petechiae. Normal temperature, turgor, and texture. Neuro/Psych: Alert, oriented. 5/5 strength in all 4 extremities. Appropriate affect, normal judgment/insight.     Results:     Lab Results   Component Value Date/Time    WBC 3.7 (L) 07/20/2022 01:35 PM    HGB 14.4 07/20/2022 01:35 PM    HCT 44.5 07/20/2022 01:35 PM    PLATELET 256 45/65/8127 01:35 PM MCV 94.9 07/20/2022 01:35 PM    ABS. NEUTROPHILS 1.7 (L) 07/20/2022 01:35 PM     Lab Results   Component Value Date/Time    Sodium 138 05/03/2022 09:00 AM    Potassium 3.9 05/03/2022 09:00 AM    Chloride 104 05/03/2022 09:00 AM    CO2 23 05/03/2022 09:00 AM    Glucose 171 (H) 05/03/2022 09:00 AM    BUN 14 05/03/2022 09:00 AM    Creatinine 1.09 05/03/2022 09:00 AM    GFR est AA >60 05/03/2022 09:00 AM    GFR est non-AA >60 05/03/2022 09:00 AM    Calcium 9.3 05/03/2022 09:00 AM    Glucose (POC) 99 11/09/2011 03:30 PM     Lab Results   Component Value Date/Time    Bilirubin, total 0.7 05/03/2022 09:00 AM    ALT (SGPT) 32 05/03/2022 09:00 AM    Alk. phosphatase 70 05/03/2022 09:00 AM    Protein, total 7.6 05/03/2022 09:00 AM    Albumin 3.9 05/03/2022 09:00 AM    Globulin 3.7 05/03/2022 09:00 AM     No results found for: IRON, FE, TIBC, IBCT, PSAT, FERR    Lab Results   Component Value Date/Time    Vitamin B12 871 07/20/2022 01:35 PM    Folate 8.0 05/09/2022 03:15 PM     Lab Results   Component Value Date/Time    TSH 0.79 05/03/2022 09:00 AM     Lab Results   Component Value Date/Time    Hepatitis A, IgM NONREACTIVE 05/09/2022 03:15 PM    Hepatitis B surface Ag <0.10 05/09/2022 03:15 PM    Hepatitis B core, IgM NONREACTIVE 05/09/2022 03:15 PM     No results found for: Russell Cervantes [id number], HCGAT      5/9/22 Peripheral blood, smear:   Normocytic normochromic erythrocytes; no increase in schistocytes   Mild leukocytopenia with neutropenia and morphology within normal limits;   no circulating blasts identified   Unremarkable platelets     Imaging:     Radiology report(s) reviewed. .      Assessment & Plan:   Santos Ding. is a 57 y.o. male is seen for neutropenia. 1. Neutropenia:  Mild and chronic with some improvement after starting on B12 injections. Peripheral smear unremarkable. Hep B profile negative.  The mild baseline neutropenia is likely due to sarcoidosis as well with some further suppression related to prior COVID infection and / or B12 deficiency. No B symptoms or recurrent infections. No further Hematology workup needed aside from HCV Ab, JEANNETTE to complete workup. -- Check HCV Ab, JEANNETTE to complete workup - will call with results. -- No further Hematology follow up needed. 2. H/o B12 deficiency / h/o small bowel resection:  B12 level low at 141 on 5/9/22. Agree that this is likely a result of prior small bowel resection resulting in decreased absorption. Now improved with monthly B12 injections. Discussed with patient that B12 injections are sure to maintain his levels as they do not rely on his intestines for absorption. However, if he chooses to switch to oral medications in the future, they may or may not be effective and his B12 deficiency may recur. While I recommend remaining on B12 injections, I defer to pt and PCP on whether he wants to do a trial of oral supplementation. Of note, after switching from B12 injections, it may take several months to years before noting low B12 levels and therefore, this needs to be monitored 1-2 times a year if B12 injections are discontinued. 3. Sarcoidosis:   Diagnosed on lung biopsy. On Stialto spray. Followed by Dr. Cas Fermin. I appreciate the opportunity to participate in Mr. Jorge Hathaway Jr.'s care.     Signed By: Vincent De La Torre MD     July 28, 2022

## 2022-07-26 NOTE — PROGRESS NOTES
Testosterone, intrinsic factor ab, PSA all wnl  Vit B12 871, much improved after IM injections. While discussing results, patient told me he had part of his small intestine removed when he was much younger from a bowel obstruction. This is likely the cause of his B12 def.  He still has follow up with heme later this week  CBC with WBC still slightly low, monoctyes 14, ANC increased to 1.7    Called patient to discuss results

## 2022-07-28 ENCOUNTER — OFFICE VISIT (OUTPATIENT)
Dept: ONCOLOGY | Age: 62
End: 2022-07-28
Payer: MEDICAID

## 2022-07-28 VITALS
HEART RATE: 73 BPM | BODY MASS INDEX: 39.2 KG/M2 | HEIGHT: 71 IN | WEIGHT: 280 LBS | DIASTOLIC BLOOD PRESSURE: 61 MMHG | OXYGEN SATURATION: 98 % | RESPIRATION RATE: 20 BRPM | SYSTOLIC BLOOD PRESSURE: 146 MMHG

## 2022-07-28 DIAGNOSIS — Z90.49 S/P SMALL BOWEL RESECTION: ICD-10-CM

## 2022-07-28 DIAGNOSIS — D70.8 OTHER NEUTROPENIA (HCC): Primary | ICD-10-CM

## 2022-07-28 DIAGNOSIS — D70.8 OTHER NEUTROPENIA (HCC): ICD-10-CM

## 2022-07-28 DIAGNOSIS — D86.9 SARCOIDOSIS: ICD-10-CM

## 2022-07-28 DIAGNOSIS — E53.8 VITAMIN B12 DEFICIENCY: ICD-10-CM

## 2022-07-28 LAB — HCV AB SERPL QL IA: NONREACTIVE

## 2022-07-28 PROCEDURE — 99204 OFFICE O/P NEW MOD 45 MIN: CPT | Performed by: INTERNAL MEDICINE

## 2022-07-28 NOTE — PROGRESS NOTES
Chief Complaint   Patient presents with    New Patient     Neutropenia, unspecified type (HCC)/ [place]       Visit Vitals  BP (!) 146/61   Pulse 73   Resp 20   Ht 5' 11\" (1.803 m)   Wt 280 lb (127 kg)   SpO2 98%   BMI 39.05 kg/m²

## 2022-07-29 LAB — ANA SER QL: NEGATIVE

## 2022-11-17 ENCOUNTER — PATIENT MESSAGE (OUTPATIENT)
Dept: FAMILY MEDICINE CLINIC | Age: 62
End: 2022-11-17

## 2022-11-17 DIAGNOSIS — N52.9 ERECTILE DYSFUNCTION, UNSPECIFIED ERECTILE DYSFUNCTION TYPE: Primary | ICD-10-CM

## 2022-11-18 RX ORDER — SILDENAFIL 25 MG/1
25 TABLET, FILM COATED ORAL
Qty: 30 TABLET | Refills: 1 | Status: SHIPPED | OUTPATIENT
Start: 2022-11-18

## 2022-11-18 NOTE — TELEPHONE ENCOUNTER
From: Cristian Fontenot. To: Scot Bowles DO  Sent: 11/17/2022 6:15 PM EST  Subject: ed meds    I have not requested a refill on generic Cialis in last few months. I would like to try another ED med. ..either generic Viagra, generic Levitra, or generic Stendra. ..if side effects are too bad. Sammy Christina I may ask to go back to generic Cialis. If you will, please forward a prescription to my Subha Kumar on file. ..or contact me at [phone] with any questions, etc. Thanks, Manolo Peters

## 2023-01-09 ENCOUNTER — PATIENT MESSAGE (OUTPATIENT)
Dept: FAMILY MEDICINE CLINIC | Age: 63
End: 2023-01-09

## 2023-01-09 DIAGNOSIS — N52.9 ERECTILE DYSFUNCTION, UNSPECIFIED ERECTILE DYSFUNCTION TYPE: Primary | ICD-10-CM

## 2023-01-19 RX ORDER — SILDENAFIL 100 MG/1
50 TABLET, FILM COATED ORAL
Qty: 30 TABLET | Refills: 1 | Status: SHIPPED | OUTPATIENT
Start: 2023-01-19

## 2023-01-19 NOTE — TELEPHONE ENCOUNTER
From: Jamal Wei. To: [facility]  Sent: 1/9/2023 2:45 PM EST  Subject: ED MED - VIAGRA    Can Dr. Anmol Hernadezn up my current dosage from 25mg to 100mg for my next refill(s)? 25mg is not doing the best!  I'll be ordering in the next few days.     Thanks, Meme Welch [phone]

## 2023-05-18 RX ORDER — OMEPRAZOLE 40 MG/1
40 CAPSULE, DELAYED RELEASE ORAL DAILY
COMMUNITY
Start: 2021-08-17 | End: 2023-06-06 | Stop reason: SDUPTHER

## 2023-05-18 RX ORDER — SILDENAFIL 100 MG/1
50 TABLET, FILM COATED ORAL DAILY PRN
COMMUNITY
Start: 2023-01-19 | End: 2023-06-23

## 2023-05-22 ENCOUNTER — PATIENT MESSAGE (OUTPATIENT)
Age: 63
End: 2023-05-22

## 2023-05-22 DIAGNOSIS — Z12.2 SCREENING FOR LUNG CANCER: Primary | ICD-10-CM

## 2023-05-25 ENCOUNTER — TELEPHONE (OUTPATIENT)
Age: 63
End: 2023-05-25

## 2023-05-25 NOTE — TELEPHONE ENCOUNTER
Per Dr. Solis, pt was called to schedule pt for lab and refills. Pt did not answer phone. A voice message was left, asking pt to return phone call.     Thank you

## 2023-05-25 NOTE — TELEPHONE ENCOUNTER
----- Message from Citlaly Jaramillo MD sent at 2023 10:40 AM EDT -----  Regarding: RE: follow up LDCT  Contact: [phone]  It looks like the patient saw Dr. Alexa Mackay a year ago for an annual wellness - would you mind calling the pt to schedule him for an appt with first available for labs and refills? It looks like Dr. Alexa Mackay referred the pt to ortho spine for the symptoms he described in his back, I'm not sure if he ever followed up with them. Thanks!  ----- Message -----  From: Viral Hobson LPN  Sent:   10:32 AM EDT  To: Ciltaly Jaramillo MD  Subject: FW: follow up LDCT                               Please advise, thank you!    ----- Message -----  From: Michelle Baer. Sent: 2023   1:12 PM EDT  To: [facility] Clinical Staff  Subject: follow up LDCT                                   OK..Thank your doctor. I believe it is approaching time for my Annual Physical, including (but not limiterd to) testing my levels for B-12, Testosterone, PSA, and A1C. For last few years, from my right back down thru my leg and down to my right foot. ..goes numb (almost useless) after walking 50 yards. Marta Berg as if I have been in a race. Marta Alvaek Marta Berg I have to stop (sit if I can) and rest till pain and numbness  goes away for me to start walking again. .I have even used a cane occassionally if a lot of walking is required for the day. I would really like to get to the bottom of this. .Will you please check and see when I am due for another physical?     Also, my perscriptions for all my Meds seem to have :  1. Omeprazole DR 40mg  2. Diclofenic   3. Stiolto Respimat  4. Viagra 100mg. ..(but may want to try Levitra  20mg . .. .(Cialis I believe may have contributed to my limb pain mentioned above. [employer] Viagra. .some vision concerns. ..but could be sarcordosis or maybe A1C may be high???))    Thank you again,  Ellie Grullon [phone]

## 2023-06-06 ENCOUNTER — OFFICE VISIT (OUTPATIENT)
Age: 63
End: 2023-06-06
Payer: MEDICAID

## 2023-06-06 ENCOUNTER — HOSPITAL ENCOUNTER (OUTPATIENT)
Facility: HOSPITAL | Age: 63
Discharge: HOME | End: 2023-06-09
Attending: STUDENT IN AN ORGANIZED HEALTH CARE EDUCATION/TRAINING PROGRAM
Payer: MEDICAID

## 2023-06-06 VITALS
BODY MASS INDEX: 39.62 KG/M2 | HEIGHT: 71 IN | DIASTOLIC BLOOD PRESSURE: 65 MMHG | OXYGEN SATURATION: 97 % | WEIGHT: 283 LBS | HEART RATE: 66 BPM | SYSTOLIC BLOOD PRESSURE: 124 MMHG | TEMPERATURE: 98.4 F | RESPIRATION RATE: 16 BRPM

## 2023-06-06 DIAGNOSIS — E53.8 VITAMIN B12 DEFICIENCY: ICD-10-CM

## 2023-06-06 DIAGNOSIS — Z12.2 SCREENING FOR LUNG CANCER: ICD-10-CM

## 2023-06-06 DIAGNOSIS — R68.82 LOW LIBIDO: ICD-10-CM

## 2023-06-06 DIAGNOSIS — R73.03 PREDIABETES: ICD-10-CM

## 2023-06-06 DIAGNOSIS — D86.9 SARCOIDOSIS: ICD-10-CM

## 2023-06-06 DIAGNOSIS — M48.061 LUMBAR FORAMINAL STENOSIS: ICD-10-CM

## 2023-06-06 DIAGNOSIS — Z12.11 SCREEN FOR COLON CANCER: ICD-10-CM

## 2023-06-06 DIAGNOSIS — E78.2 MIXED HYPERLIPIDEMIA: ICD-10-CM

## 2023-06-06 DIAGNOSIS — Z23 ENCOUNTER FOR IMMUNIZATION: ICD-10-CM

## 2023-06-06 DIAGNOSIS — Z00.00 ENCOUNTER FOR WELLNESS EXAMINATION IN ADULT: Primary | ICD-10-CM

## 2023-06-06 PROCEDURE — 99397 PER PM REEVAL EST PAT 65+ YR: CPT | Performed by: STUDENT IN AN ORGANIZED HEALTH CARE EDUCATION/TRAINING PROGRAM

## 2023-06-06 PROCEDURE — 90715 TDAP VACCINE 7 YRS/> IM: CPT | Performed by: STUDENT IN AN ORGANIZED HEALTH CARE EDUCATION/TRAINING PROGRAM

## 2023-06-06 PROCEDURE — 90732 PPSV23 VACC 2 YRS+ SUBQ/IM: CPT | Performed by: STUDENT IN AN ORGANIZED HEALTH CARE EDUCATION/TRAINING PROGRAM

## 2023-06-06 PROCEDURE — 99214 OFFICE O/P EST MOD 30 MIN: CPT | Performed by: STUDENT IN AN ORGANIZED HEALTH CARE EDUCATION/TRAINING PROGRAM

## 2023-06-06 PROCEDURE — 71271 CT THORAX LUNG CANCER SCR C-: CPT

## 2023-06-06 RX ORDER — OMEPRAZOLE 40 MG/1
40 CAPSULE, DELAYED RELEASE ORAL DAILY
Qty: 30 CAPSULE | Refills: 3 | Status: SHIPPED | OUTPATIENT
Start: 2023-06-06

## 2023-06-06 SDOH — ECONOMIC STABILITY: FOOD INSECURITY: WITHIN THE PAST 12 MONTHS, THE FOOD YOU BOUGHT JUST DIDN'T LAST AND YOU DIDN'T HAVE MONEY TO GET MORE.: PATIENT DECLINED

## 2023-06-06 SDOH — ECONOMIC STABILITY: HOUSING INSECURITY
IN THE LAST 12 MONTHS, WAS THERE A TIME WHEN YOU DID NOT HAVE A STEADY PLACE TO SLEEP OR SLEPT IN A SHELTER (INCLUDING NOW)?: YES

## 2023-06-06 SDOH — ECONOMIC STABILITY: FOOD INSECURITY: WITHIN THE PAST 12 MONTHS, YOU WORRIED THAT YOUR FOOD WOULD RUN OUT BEFORE YOU GOT MONEY TO BUY MORE.: PATIENT DECLINED

## 2023-06-06 SDOH — ECONOMIC STABILITY: INCOME INSECURITY: HOW HARD IS IT FOR YOU TO PAY FOR THE VERY BASICS LIKE FOOD, HOUSING, MEDICAL CARE, AND HEATING?: NOT VERY HARD

## 2023-06-06 ASSESSMENT — PATIENT HEALTH QUESTIONNAIRE - PHQ9
SUM OF ALL RESPONSES TO PHQ QUESTIONS 1-9: 0
2. FEELING DOWN, DEPRESSED OR HOPELESS: 0
SUM OF ALL RESPONSES TO PHQ QUESTIONS 1-9: 0
1. LITTLE INTEREST OR PLEASURE IN DOING THINGS: 0
SUM OF ALL RESPONSES TO PHQ9 QUESTIONS 1 & 2: 0

## 2023-06-06 NOTE — PROGRESS NOTES
I reviewed with the resident the medical history and the resident's findings on the physical examination. I discussed with the resident the patient's diagnosis and concur with the plan. Wellness exam. Due for colonoscopy, referred to GI. Low dose lung CT today and annually. Tdap and pneumovax today. PSA check. Needs triple AAA screening, declined.
Jorge Alberto Burton. is a 70 y.o. male      Chief Complaint   Patient presents with    Medication Refill     Patient is coming in for medication refill and labs. No other concerns. 1. Have you been to the ER, urgent care clinic since your last visit? Hospitalized since your last visit? No    2. Have you seen or consulted any other health care providers outside of the [address] [place] [address] since your last visit? Include any pap smears or colon screening. No    Vitals:    06/06/23 1329   BP: 124/65   Site: Right Upper Arm   Position: Sitting   Pulse: 66   Resp: 16   Temp: 98.4 °F (36.9 °C)   TempSrc: Oral   SpO2: 97%   Weight: 283 lb (128.4 kg)   Height: 5' 11\" (1.803 m)            Health Maintenance Due   Topic Date Due    Pneumococcal 65+ years Vaccine (1 - PCV) 04/29/1966    Diabetic foot exam  Never done    Diabetic retinal exam  Never done    Shingles vaccine (1 of 2) Never done    Colorectal Cancer Screen  08/12/2020    DTaP/Tdap/Td vaccine (2 - Td or Tdap) 01/31/2021    COVID-19 Vaccine (2 - Laz risk series) 04/16/2021    AAA screen  Never done    A1C test (Diabetic or Prediabetic)  05/03/2023    Diabetic Alb to Cr ratio (uACR) test  05/03/2023    Lipids  05/03/2023    GFR test (Diabetes, CKD 3-4, OR last GFR 15-59)  05/03/2023    Low dose CT lung screening  06/01/2023         Medication Reconciliation completed, changes noted.   Please  Update medication list.
Rizwan King, (age 8 yrs+), IM        - f/u labs  - Counseled re: diet, exercise, healthy lifestyle  - Return for yearly wellness visits      Patient seen and discussed with Dr. Army Rogers      Signed By:  Joya Fernandez MD    Family Medicine Resident

## 2023-06-07 PROBLEM — L91.8 SKIN TAG: Status: ACTIVE | Noted: 2023-06-07

## 2023-06-08 LAB
CHOLEST SERPL-MCNC: 195 MG/DL
HDLC SERPL-MCNC: 44 MG/DL
HDLC SERPL: 4.4 (ref 0–5)
LDLC SERPL CALC-MCNC: 113 MG/DL (ref 0–100)
TRIGL SERPL-MCNC: 190 MG/DL
VLDLC SERPL CALC-MCNC: 38 MG/DL

## 2023-06-23 RX ORDER — SILDENAFIL 100 MG/1
TABLET, FILM COATED ORAL
Qty: 30 TABLET | Refills: 2 | Status: SHIPPED | OUTPATIENT
Start: 2023-06-23

## 2023-08-14 DIAGNOSIS — E11.9 TYPE 2 DIABETES MELLITUS WITHOUT COMPLICATION, WITHOUT LONG-TERM CURRENT USE OF INSULIN (HCC): ICD-10-CM

## 2023-08-14 RX ORDER — ORAL SEMAGLUTIDE 7 MG/1
7 TABLET ORAL DAILY
Qty: 90 TABLET | Refills: 0 | Status: CANCELLED | OUTPATIENT
Start: 2023-08-14

## 2023-08-15 NOTE — TELEPHONE ENCOUNTER
----- Message from Trae Chandler sent at 8/11/2023  4:28 PM EDT -----  Subject: Medication Problem    Medication: Semaglutide (RYBELSUS) 3 MG TABS  Dosage: 3 mg, 7 mg 1 tablet daily  Ordering Provider: Verito Conde     Question/Problem: Pt would like to follow up about RX that has been   missing since June 2023. Please contact to discuss as soon as possible to   move forward with getting RX.        Pharmacy: [facility] [address], [address] [place] [address] [phone] [facility] [phone]    ---------------------------------------------------------------------------  --------------  Mian HARMAN  [phone]; OK to leave message on voicemail  ---------------------------------------------------------------------------  --------------    SCRIPT ANSWERS  Relationship to Patient: Self

## 2023-08-15 NOTE — TELEPHONE ENCOUNTER
This writer contacted the pt to let him know the request was seen and sent to the doctor. This writer stated that pt would receive a call with an updated of whether or no the request can be processed. Thank you!

## 2023-08-16 DIAGNOSIS — E11.9 TYPE 2 DIABETES MELLITUS WITHOUT COMPLICATION, WITHOUT LONG-TERM CURRENT USE OF INSULIN (HCC): ICD-10-CM

## 2023-08-16 RX ORDER — ORAL SEMAGLUTIDE 7 MG/1
7 TABLET ORAL DAILY
Qty: 90 TABLET | Refills: 0 | Status: SHIPPED | OUTPATIENT
Start: 2023-08-16

## 2023-08-16 RX ORDER — ORAL SEMAGLUTIDE 3 MG/1
3 TABLET ORAL DAILY
Qty: 90 TABLET | Refills: 3 | Status: SHIPPED | OUTPATIENT
Start: 2023-08-16

## 2023-08-23 DIAGNOSIS — E11.9 TYPE 2 DIABETES MELLITUS WITHOUT COMPLICATION, WITHOUT LONG-TERM CURRENT USE OF INSULIN (HCC): ICD-10-CM

## 2023-08-23 RX ORDER — ORAL SEMAGLUTIDE 7 MG/1
7 TABLET ORAL DAILY
Qty: 30 TABLET | Refills: 0 | Status: SHIPPED | OUTPATIENT
Start: 2023-09-23

## 2023-08-23 RX ORDER — ORAL SEMAGLUTIDE 3 MG/1
3 TABLET ORAL DAILY
Qty: 30 TABLET | Refills: 0 | Status: SHIPPED | OUTPATIENT
Start: 2023-08-23

## 2023-08-23 NOTE — PROGRESS NOTES
Patient unable to fill 3 mg Rx. Will resend in Rx. 3 mg for 30 days then increase to 7 mg. 30 day course filled.

## 2024-01-26 ENCOUNTER — TELEPHONE (OUTPATIENT)
Age: 64
End: 2024-01-26

## 2024-01-26 DIAGNOSIS — E11.9 TYPE 2 DIABETES MELLITUS WITHOUT COMPLICATION, WITHOUT LONG-TERM CURRENT USE OF INSULIN (HCC): ICD-10-CM

## 2024-01-26 DIAGNOSIS — R68.82 LOW LIBIDO: Primary | ICD-10-CM

## 2024-01-26 DIAGNOSIS — D86.9 SARCOIDOSIS: ICD-10-CM

## 2024-01-26 NOTE — TELEPHONE ENCOUNTER
Previous pt of Dr. Salvador. He has scheduled an appt for 2/16; however, he is out of medication and requesting a short refills of the following medications:      tiotropium-olodaterol (STIOLTO) 2.5-2.5 MCG/ACT AERS     sildenafil (VIAGRA) 100 MG tablet     Semaglutide (RYBELSUS) 7 MG TABS     If appropriate, please send to [facility] at [facility].    Thank you

## 2024-01-28 RX ORDER — SILDENAFIL 100 MG/1
TABLET, FILM COATED ORAL
Qty: 30 TABLET | Refills: 2 | Status: SHIPPED | OUTPATIENT
Start: 2024-01-28

## 2024-01-28 RX ORDER — ORAL SEMAGLUTIDE 7 MG/1
7 TABLET ORAL DAILY
Qty: 30 TABLET | Refills: 0 | Status: SHIPPED | OUTPATIENT
Start: 2024-01-28

## 2024-01-31 ENCOUNTER — OFFICE VISIT (OUTPATIENT)
Age: 64
End: 2024-01-31
Payer: MEDICARE

## 2024-01-31 VITALS
WEIGHT: 271.5 LBS | DIASTOLIC BLOOD PRESSURE: 68 MMHG | OXYGEN SATURATION: 97 % | HEIGHT: 70 IN | HEART RATE: 84 BPM | SYSTOLIC BLOOD PRESSURE: 151 MMHG | BODY MASS INDEX: 38.87 KG/M2

## 2024-01-31 DIAGNOSIS — E11.9 TYPE 2 DIABETES MELLITUS WITHOUT COMPLICATION, WITHOUT LONG-TERM CURRENT USE OF INSULIN (HCC): ICD-10-CM

## 2024-01-31 DIAGNOSIS — E78.2 MIXED HYPERLIPIDEMIA: ICD-10-CM

## 2024-01-31 DIAGNOSIS — D86.9 SARCOIDOSIS: ICD-10-CM

## 2024-01-31 DIAGNOSIS — Z12.11 ENCOUNTER FOR SCREENING COLONOSCOPY: Primary | ICD-10-CM

## 2024-01-31 DIAGNOSIS — Z86.010 HX OF COLONIC POLYP: ICD-10-CM

## 2024-01-31 DIAGNOSIS — Z00.00 MEDICARE ANNUAL WELLNESS VISIT, INITIAL: ICD-10-CM

## 2024-01-31 PROCEDURE — G8484 FLU IMMUNIZE NO ADMIN: HCPCS | Performed by: FAMILY MEDICINE

## 2024-01-31 PROCEDURE — 3046F HEMOGLOBIN A1C LEVEL >9.0%: CPT | Performed by: FAMILY MEDICINE

## 2024-01-31 PROCEDURE — PBSHW INFLUENZA, FLUAD, (AGE 65 Y+), IM, PF, 0.5 ML: Performed by: FAMILY MEDICINE

## 2024-01-31 PROCEDURE — 1123F ACP DISCUSS/DSCN MKR DOCD: CPT | Performed by: FAMILY MEDICINE

## 2024-01-31 PROCEDURE — 90694 VACC AIIV4 NO PRSRV 0.5ML IM: CPT | Performed by: FAMILY MEDICINE

## 2024-01-31 PROCEDURE — 3017F COLORECTAL CA SCREEN DOC REV: CPT | Performed by: FAMILY MEDICINE

## 2024-01-31 PROCEDURE — G0438 PPPS, INITIAL VISIT: HCPCS | Performed by: FAMILY MEDICINE

## 2024-01-31 RX ORDER — ORAL SEMAGLUTIDE 7 MG/1
7 TABLET ORAL DAILY
Qty: 30 TABLET | Refills: 0 | Status: CANCELLED | OUTPATIENT
Start: 2024-01-31

## 2024-01-31 ASSESSMENT — LIFESTYLE VARIABLES
HOW OFTEN DO YOU HAVE A DRINK CONTAINING ALCOHOL: 2-4 TIMES A MONTH
HOW MANY STANDARD DRINKS CONTAINING ALCOHOL DO YOU HAVE ON A TYPICAL DAY: 1 OR 2

## 2024-01-31 ASSESSMENT — PATIENT HEALTH QUESTIONNAIRE - PHQ9
SUM OF ALL RESPONSES TO PHQ QUESTIONS 1-9: 0
2. FEELING DOWN, DEPRESSED OR HOPELESS: 0
1. LITTLE INTEREST OR PLEASURE IN DOING THINGS: 0
SUM OF ALL RESPONSES TO PHQ QUESTIONS 1-9: 0
SUM OF ALL RESPONSES TO PHQ QUESTIONS 1-9: 0
SUM OF ALL RESPONSES TO PHQ9 QUESTIONS 1 & 2: 0
SUM OF ALL RESPONSES TO PHQ QUESTIONS 1-9: 0

## 2024-01-31 NOTE — PROGRESS NOTES
[facility]   [facility]      Chief Complaint:   Chief Complaint   Patient presents with    Medicare AWV       SUBJECTIVE:  Edmund Cochran Jr. is a 63 y.o. male who presents for     MAW: Questionnaire reviewed.    DM: Currently prescribed Semaglutide 7mg daily but he is taking every other day b/c he is having difficulty getting insurance to approve the medication refills.   Last A1c was 7.6 on 6/7/23 which was prior to starting any medication.  He has tried metformin in the past unable to tolerate due to GI side effects.     HL: Managed with diet and exercise.  Statin intolerant in the past.     HTN: No current medication.  Home BP readings usually 130s/70s.  No CP/SOB.     Sarcoidosis (pulm):  Currently taking Stiolto 2 puffs daily.  Follows with Pulm.    GERD: Currently taking Prilosec 40mg daily PRN.    ED: Currently taking Viagra PRN.  Prior testosterone levels have been WNL.    Last PSA: 0.3 on 6/7/23    PMHx:  Past Medical History:   Diagnosis Date    AR (allergic rhinitis)     Arthritis     COPD (chronic obstructive pulmonary disease) ([facility])     ED (erectile dysfunction)     GERD (gastroesophageal reflux disease)     History of stress test 05/05/2015    pt states results were normal    Tinea pedis        Meds:   Current Outpatient Medications   Medication Sig Dispense Refill    tiotropium-olodaterol (STIOLTO) 2.5-2.5 MCG/ACT AERS Inhale 2 puffs into the lungs daily 4 g 2    sildenafil (VIAGRA) 100 MG tablet TAKE 1/2 (ONE-HALF) TABLET BY MOUTH ONCE DAILY AS NEEDED FOR ERECTILE DYSFUNCTION 30 tablet 2    Semaglutide (RYBELSUS) 7 MG TABS Take 7 mg by mouth daily 30 tablet 0    DICLOFENAC PO Take by mouth      Cyanocobalamin (VITAMIN B-12 PO) Take by mouth      Multiple Vitamin (MULTIVITAMIN PO) Take by mouth      omeprazole (PRILOSEC) 40 MG delayed release capsule Take 1 capsule by mouth daily 30 capsule 3     No current

## 2024-01-31 NOTE — PROGRESS NOTES
Referral to colo screening     Chief Complaint   Patient presents with    Medicare AWV     Vitals:    01/31/24 1556   BP: (!) 151/68   Pulse: 84   SpO2: 97%

## 2024-02-01 LAB
ANION GAP SERPL CALC-SCNC: 5 MMOL/L (ref 5–15)
BUN SERPL-MCNC: 19 MG/DL (ref 6–20)
BUN/CREAT SERPL: 17 (ref 12–20)
CALCIUM SERPL-MCNC: 9.4 MG/DL (ref 8.5–10.1)
CHLORIDE SERPL-SCNC: 105 MMOL/L (ref 97–108)
CO2 SERPL-SCNC: 29 MMOL/L (ref 21–32)
CREAT SERPL-MCNC: 1.12 MG/DL (ref 0.7–1.3)
GLUCOSE SERPL-MCNC: 155 MG/DL (ref 65–100)
POTASSIUM SERPL-SCNC: 4.6 MMOL/L (ref 3.5–5.1)
SODIUM SERPL-SCNC: 139 MMOL/L (ref 136–145)

## 2024-02-02 LAB
EST. AVERAGE GLUCOSE BLD GHB EST-MCNC: 131 MG/DL
HBA1C MFR BLD: 6.2 % (ref 4–5.6)

## 2024-03-04 ENCOUNTER — TELEPHONE (OUTPATIENT)
Age: 64
End: 2024-03-04

## 2024-03-04 NOTE — TELEPHONE ENCOUNTER
----- Message from Edmund Cochran Jr. sent at 3/4/2024 10:08 AM EST -----  Regarding: Jardiance  Contact: [phone]  Hope al is well.  Can we consider another Med for lowering my A1C?  The Jardiance has some side-effects I have felt within this last month.    Thanks, Jimmy

## 2024-03-13 ENCOUNTER — OFFICE VISIT (OUTPATIENT)
Age: 64
End: 2024-03-13
Payer: MEDICARE

## 2024-03-13 VITALS
HEART RATE: 62 BPM | WEIGHT: 277 LBS | SYSTOLIC BLOOD PRESSURE: 131 MMHG | OXYGEN SATURATION: 98 % | BODY MASS INDEX: 39.75 KG/M2 | DIASTOLIC BLOOD PRESSURE: 56 MMHG

## 2024-03-13 DIAGNOSIS — E11.9 TYPE 2 DIABETES MELLITUS WITHOUT COMPLICATION, WITHOUT LONG-TERM CURRENT USE OF INSULIN (HCC): Primary | ICD-10-CM

## 2024-03-13 DIAGNOSIS — N52.9 ERECTILE DYSFUNCTION, UNSPECIFIED ERECTILE DYSFUNCTION TYPE: ICD-10-CM

## 2024-03-13 DIAGNOSIS — K21.9 GASTROESOPHAGEAL REFLUX DISEASE WITHOUT ESOPHAGITIS: ICD-10-CM

## 2024-03-13 DIAGNOSIS — E78.2 MIXED HYPERLIPIDEMIA: ICD-10-CM

## 2024-03-13 PROCEDURE — 1036F TOBACCO NON-USER: CPT | Performed by: FAMILY MEDICINE

## 2024-03-13 PROCEDURE — 99214 OFFICE O/P EST MOD 30 MIN: CPT | Performed by: FAMILY MEDICINE

## 2024-03-13 PROCEDURE — 3044F HG A1C LEVEL LT 7.0%: CPT | Performed by: FAMILY MEDICINE

## 2024-03-13 PROCEDURE — G8484 FLU IMMUNIZE NO ADMIN: HCPCS | Performed by: FAMILY MEDICINE

## 2024-03-13 PROCEDURE — 3017F COLORECTAL CA SCREEN DOC REV: CPT | Performed by: FAMILY MEDICINE

## 2024-03-13 PROCEDURE — 2022F DILAT RTA XM EVC RTNOPTHY: CPT | Performed by: FAMILY MEDICINE

## 2024-03-13 PROCEDURE — 1123F ACP DISCUSS/DSCN MKR DOCD: CPT | Performed by: FAMILY MEDICINE

## 2024-03-13 PROCEDURE — G8417 CALC BMI ABV UP PARAM F/U: HCPCS | Performed by: FAMILY MEDICINE

## 2024-03-13 PROCEDURE — G8427 DOCREV CUR MEDS BY ELIG CLIN: HCPCS | Performed by: FAMILY MEDICINE

## 2024-03-13 RX ORDER — OMEPRAZOLE 40 MG/1
40 CAPSULE, DELAYED RELEASE ORAL DAILY
Qty: 30 CAPSULE | Refills: 3 | Status: CANCELLED | OUTPATIENT
Start: 2024-03-13

## 2024-03-13 ASSESSMENT — PATIENT HEALTH QUESTIONNAIRE - PHQ9
SUM OF ALL RESPONSES TO PHQ QUESTIONS 1-9: 1
SUM OF ALL RESPONSES TO PHQ9 QUESTIONS 1 & 2: 1
2. FEELING DOWN, DEPRESSED OR HOPELESS: 0
1. LITTLE INTEREST OR PLEASURE IN DOING THINGS: 1

## 2024-03-13 NOTE — PROGRESS NOTES
Patient has been having some yeast infection or irritation in the pelvic area.Stop taking 03/01/2024, needs a referral to [facility] for skin tag and possible toe fungus.  Chief Complaint   Patient presents with    medication evaluation    Hypertension     F/u     Vitals:    03/13/24 1542   BP: (!) 131/56   Pulse: 62   SpO2: 98%      
  HTN: Continue to manage with diet and exercise.      Sarcoidosis (pulm):  Continue taking Stiolto 2 puffs daily.  Followed by Pulm.     GERD: Will switch to Pepcid complete as he was only taking the prilosec PRN about once a week.  Will restart prilosec if sx not controlled with Pepcid complete.      ED: Continue taking Viagra PRN.       RTC: 3 months.

## 2024-06-05 DIAGNOSIS — D86.9 SARCOIDOSIS: ICD-10-CM

## 2024-06-05 DIAGNOSIS — R68.82 LOW LIBIDO: ICD-10-CM

## 2024-06-05 RX ORDER — TIOTROPIUM BROMIDE AND OLODATEROL 3.124; 2.736 UG/1; UG/1
SPRAY, METERED RESPIRATORY (INHALATION)
Qty: 4 G | Refills: 0 | Status: SHIPPED | OUTPATIENT
Start: 2024-06-05

## 2024-06-05 RX ORDER — SILDENAFIL 100 MG/1
TABLET, FILM COATED ORAL
Qty: 30 TABLET | Refills: 0 | Status: SHIPPED | OUTPATIENT
Start: 2024-06-05

## 2024-07-14 DIAGNOSIS — D86.9 SARCOIDOSIS: ICD-10-CM

## 2024-07-14 DIAGNOSIS — R68.82 LOW LIBIDO: ICD-10-CM

## 2024-07-15 RX ORDER — SILDENAFIL 100 MG/1
TABLET, FILM COATED ORAL
Qty: 30 TABLET | Refills: 0 | Status: SHIPPED | OUTPATIENT
Start: 2024-07-15

## 2024-07-15 RX ORDER — TIOTROPIUM BROMIDE AND OLODATEROL 3.124; 2.736 UG/1; UG/1
SPRAY, METERED RESPIRATORY (INHALATION)
Qty: 4 G | Refills: 2 | Status: SHIPPED | OUTPATIENT
Start: 2024-07-15

## 2024-07-15 NOTE — TELEPHONE ENCOUNTER
Medication Refill Request    Edmund CARLOS Cochran Jr. is requesting a refill of the following medication(s):   Requested Prescriptions     Pending Prescriptions Disp Refills    sildenafil (VIAGRA) 100 MG tablet [Pharmacy Med Name: Sildenafil Citrate 100 MG Oral Tablet] 30 tablet 0     Sig: TAKE 1/2 (ONE-HALF) TABLET BY MOUTH ONCE DAILY AS NEEDED FOR ERECTILE DYSFUNCTION    STIOLTO RESPIMAT 2.5-2.5 MCG/ACT AERS [Pharmacy Med Name: Stiolto Respimat 2.5-2.5 MCG/ACT Inhalation Aerosol Solution] 4 g 0     Sig: INHALE 2 PUFFS BY MOUTH ONCE DAILY        Listed PCP is Saul Evans MD   Last provider to prescribe medication: Dr Barron  Last Date of Medication Prescribed:  06/05/2024  Date of Last Office Visit at [facility]:  03/13/2024  FUTURE APPOINTMENT: No future appointments.    Please send refill to:  [facility]     Please review request and approve or deny with recommendations. '

## 2024-07-19 ENCOUNTER — ANCILLARY PROCEDURE (OUTPATIENT)
Age: 64
End: 2024-07-19
Payer: MEDICARE

## 2024-07-19 ENCOUNTER — OFFICE VISIT (OUTPATIENT)
Age: 64
End: 2024-07-19
Payer: MEDICARE

## 2024-07-19 VITALS
OXYGEN SATURATION: 98 % | WEIGHT: 276 LBS | SYSTOLIC BLOOD PRESSURE: 138 MMHG | DIASTOLIC BLOOD PRESSURE: 70 MMHG | BODY MASS INDEX: 39.6 KG/M2 | HEART RATE: 59 BPM

## 2024-07-19 DIAGNOSIS — M25.512 ACUTE PAIN OF LEFT SHOULDER: ICD-10-CM

## 2024-07-19 DIAGNOSIS — M25.562 ACUTE PAIN OF LEFT KNEE: ICD-10-CM

## 2024-07-19 DIAGNOSIS — M25.522 LEFT ELBOW PAIN: ICD-10-CM

## 2024-07-19 DIAGNOSIS — M25.522 LEFT ELBOW PAIN: Primary | ICD-10-CM

## 2024-07-19 DIAGNOSIS — M54.50 ACUTE LEFT-SIDED LOW BACK PAIN WITHOUT SCIATICA: ICD-10-CM

## 2024-07-19 PROCEDURE — 73080 X-RAY EXAM OF ELBOW: CPT

## 2024-07-19 PROCEDURE — 1036F TOBACCO NON-USER: CPT | Performed by: STUDENT IN AN ORGANIZED HEALTH CARE EDUCATION/TRAINING PROGRAM

## 2024-07-19 PROCEDURE — 72100 X-RAY EXAM L-S SPINE 2/3 VWS: CPT

## 2024-07-19 PROCEDURE — 1123F ACP DISCUSS/DSCN MKR DOCD: CPT | Performed by: STUDENT IN AN ORGANIZED HEALTH CARE EDUCATION/TRAINING PROGRAM

## 2024-07-19 PROCEDURE — G8427 DOCREV CUR MEDS BY ELIG CLIN: HCPCS | Performed by: STUDENT IN AN ORGANIZED HEALTH CARE EDUCATION/TRAINING PROGRAM

## 2024-07-19 PROCEDURE — 73562 X-RAY EXAM OF KNEE 3: CPT

## 2024-07-19 PROCEDURE — 3017F COLORECTAL CA SCREEN DOC REV: CPT | Performed by: STUDENT IN AN ORGANIZED HEALTH CARE EDUCATION/TRAINING PROGRAM

## 2024-07-19 PROCEDURE — 73030 X-RAY EXAM OF SHOULDER: CPT

## 2024-07-19 PROCEDURE — 99213 OFFICE O/P EST LOW 20 MIN: CPT | Performed by: STUDENT IN AN ORGANIZED HEALTH CARE EDUCATION/TRAINING PROGRAM

## 2024-07-19 PROCEDURE — G8417 CALC BMI ABV UP PARAM F/U: HCPCS | Performed by: STUDENT IN AN ORGANIZED HEALTH CARE EDUCATION/TRAINING PROGRAM

## 2024-07-19 ASSESSMENT — ENCOUNTER SYMPTOMS
NAUSEA: 0
BACK PAIN: 1
EYE PAIN: 0
COUGH: 0
VOMITING: 0
DIARRHEA: 0
CHEST TIGHTNESS: 0
ABDOMINAL DISTENTION: 0
SHORTNESS OF BREATH: 0
WHEEZING: 0

## 2024-07-19 ASSESSMENT — PATIENT HEALTH QUESTIONNAIRE - PHQ9
2. FEELING DOWN, DEPRESSED OR HOPELESS: NOT AT ALL
SUM OF ALL RESPONSES TO PHQ QUESTIONS 1-9: 1
SUM OF ALL RESPONSES TO PHQ9 QUESTIONS 1 & 2: 1
SUM OF ALL RESPONSES TO PHQ QUESTIONS 1-9: 1
1. LITTLE INTEREST OR PLEASURE IN DOING THINGS: SEVERAL DAYS
SUM OF ALL RESPONSES TO PHQ QUESTIONS 1-9: 1
SUM OF ALL RESPONSES TO PHQ QUESTIONS 1-9: 1

## 2024-07-19 ASSESSMENT — LIFESTYLE VARIABLES
HOW OFTEN DO YOU HAVE A DRINK CONTAINING ALCOHOL: MONTHLY OR LESS
HOW MANY STANDARD DRINKS CONTAINING ALCOHOL DO YOU HAVE ON A TYPICAL DAY: 1 OR 2
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
## 2024-07-19 NOTE — PROGRESS NOTES
I reviewed with the fellow the medical history and the findings on the physical examination.  I discussed with the fellow the patient's diagnosis and concur with the plan.     Marjorie Pierce MD 7/19/2024

## 2024-07-26 ENCOUNTER — TELEPHONE (OUTPATIENT)
Age: 64
End: 2024-07-26
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
## 2024-08-04 SDOH — ECONOMIC STABILITY: FOOD INSECURITY: WITHIN THE PAST 12 MONTHS, THE FOOD YOU BOUGHT JUST DIDN'T LAST AND YOU DIDN'T HAVE MONEY TO GET MORE.: NEVER TRUE

## 2024-08-04 SDOH — ECONOMIC STABILITY: HOUSING INSECURITY
IN THE LAST 12 MONTHS, WAS THERE A TIME WHEN YOU DID NOT HAVE A STEADY PLACE TO SLEEP OR SLEPT IN A SHELTER (INCLUDING NOW)?: NO

## 2024-08-04 SDOH — ECONOMIC STABILITY: FOOD INSECURITY: WITHIN THE PAST 12 MONTHS, YOU WORRIED THAT YOUR FOOD WOULD RUN OUT BEFORE YOU GOT MONEY TO BUY MORE.: NEVER TRUE

## 2024-08-04 SDOH — ECONOMIC STABILITY: INCOME INSECURITY: HOW HARD IS IT FOR YOU TO PAY FOR THE VERY BASICS LIKE FOOD, HOUSING, MEDICAL CARE, AND HEATING?: NOT VERY HARD

## 2024-08-04 SDOH — ECONOMIC STABILITY: TRANSPORTATION INSECURITY
IN THE PAST 12 MONTHS, HAS LACK OF TRANSPORTATION KEPT YOU FROM MEETINGS, WORK, OR FROM GETTING THINGS NEEDED FOR DAILY LIVING?: NO

## 2024-08-04 SDOH — HEALTH STABILITY: PHYSICAL HEALTH: ON AVERAGE, HOW MANY DAYS PER WEEK DO YOU ENGAGE IN MODERATE TO STRENUOUS EXERCISE (LIKE A BRISK WALK)?: 0 DAYS

## 2024-08-04 ASSESSMENT — PATIENT HEALTH QUESTIONNAIRE - PHQ9
SUM OF ALL RESPONSES TO PHQ9 QUESTIONS 1 & 2: 0
SUM OF ALL RESPONSES TO PHQ QUESTIONS 1-9: 0
1. LITTLE INTEREST OR PLEASURE IN DOING THINGS: NOT AT ALL
SUM OF ALL RESPONSES TO PHQ QUESTIONS 1-9: 0
2. FEELING DOWN, DEPRESSED OR HOPELESS: NOT AT ALL

## 2024-08-04 ASSESSMENT — LIFESTYLE VARIABLES
HOW MANY STANDARD DRINKS CONTAINING ALCOHOL DO YOU HAVE ON A TYPICAL DAY: 1 OR 2
HOW OFTEN DO YOU HAVE SIX OR MORE DRINKS ON ONE OCCASION: 2
HOW OFTEN DURING THE LAST YEAR HAVE YOU NEEDED AN ALCOHOLIC DRINK FIRST THING IN THE MORNING TO GET YOURSELF GOING AFTER A NIGHT OF HEAVY DRINKING: NEVER
HAVE YOU OR SOMEONE ELSE BEEN INJURED AS A RESULT OF YOUR DRINKING: NO
HOW OFTEN DO YOU HAVE A DRINK CONTAINING ALCOHOL: 2
HOW MANY STANDARD DRINKS CONTAINING ALCOHOL DO YOU HAVE ON A TYPICAL DAY: 1
HOW OFTEN DURING THE LAST YEAR HAVE YOU NEEDED AN ALCOHOLIC DRINK FIRST THING IN THE MORNING TO GET YOURSELF GOING AFTER A NIGHT OF HEAVY DRINKING: NEVER
HOW OFTEN DURING THE LAST YEAR HAVE YOU FAILED TO DO WHAT WAS NORMALLY EXPECTED FROM YOU BECAUSE OF DRINKING: NEVER
HAVE YOU OR SOMEONE ELSE BEEN INJURED AS A RESULT OF YOUR DRINKING: NO
HOW OFTEN DURING THE LAST YEAR HAVE YOU FOUND THAT YOU WERE NOT ABLE TO STOP DRINKING ONCE YOU HAD STARTED: NEVER
HOW OFTEN DURING THE LAST YEAR HAVE YOU FOUND THAT YOU WERE NOT ABLE TO STOP DRINKING ONCE YOU HAD STARTED: NEVER
HAS A RELATIVE, FRIEND, DOCTOR, OR ANOTHER HEALTH PROFESSIONAL EXPRESSED CONCERN ABOUT YOUR DRINKING OR SUGGESTED YOU CUT DOWN: NO
HOW OFTEN DO YOU HAVE A DRINK CONTAINING ALCOHOL: MONTHLY OR LESS
HOW OFTEN DURING THE LAST YEAR HAVE YOU FAILED TO DO WHAT WAS NORMALLY EXPECTED FROM YOU BECAUSE OF DRINKING: NEVER
HOW OFTEN DURING THE LAST YEAR HAVE YOU BEEN UNABLE TO REMEMBER WHAT HAPPENED THE NIGHT BEFORE BECAUSE YOU HAD BEEN DRINKING: NEVER
HAS A RELATIVE, FRIEND, DOCTOR, OR ANOTHER HEALTH PROFESSIONAL EXPRESSED CONCERN ABOUT YOUR DRINKING OR SUGGESTED YOU CUT DOWN: NO
HOW OFTEN DURING THE LAST YEAR HAVE YOU BEEN UNABLE TO REMEMBER WHAT HAPPENED THE NIGHT BEFORE BECAUSE YOU HAD BEEN DRINKING: NEVER
HOW OFTEN DURING THE LAST YEAR HAVE YOU HAD A FEELING OF GUILT OR REMORSE AFTER DRINKING: NEVER
HOW OFTEN DURING THE LAST YEAR HAVE YOU HAD A FEELING OF GUILT OR REMORSE AFTER DRINKING: NEVER

## 2024-08-07 ENCOUNTER — OFFICE VISIT (OUTPATIENT)
Age: 64
End: 2024-08-07
Payer: MEDICARE

## 2024-08-07 VITALS
SYSTOLIC BLOOD PRESSURE: 166 MMHG | OXYGEN SATURATION: 98 % | HEART RATE: 63 BPM | RESPIRATION RATE: 18 BRPM | DIASTOLIC BLOOD PRESSURE: 82 MMHG | BODY MASS INDEX: 39.32 KG/M2 | WEIGHT: 274.03 LBS

## 2024-08-07 DIAGNOSIS — I10 HTN (HYPERTENSION), BENIGN: ICD-10-CM

## 2024-08-07 DIAGNOSIS — N40.0 BENIGN PROSTATIC HYPERPLASIA, UNSPECIFIED WHETHER LOWER URINARY TRACT SYMPTOMS PRESENT: ICD-10-CM

## 2024-08-07 DIAGNOSIS — Z00.00 MEDICARE ANNUAL WELLNESS VISIT, SUBSEQUENT: Primary | ICD-10-CM

## 2024-08-07 DIAGNOSIS — E11.9 TYPE 2 DIABETES MELLITUS WITHOUT COMPLICATION, WITHOUT LONG-TERM CURRENT USE OF INSULIN (HCC): ICD-10-CM

## 2024-08-07 DIAGNOSIS — E78.2 MIXED HYPERLIPIDEMIA: ICD-10-CM

## 2024-08-07 DIAGNOSIS — D86.9 SARCOIDOSIS: ICD-10-CM

## 2024-08-07 PROCEDURE — 3079F DIAST BP 80-89 MM HG: CPT | Performed by: FAMILY MEDICINE

## 2024-08-07 PROCEDURE — G0439 PPPS, SUBSEQ VISIT: HCPCS | Performed by: FAMILY MEDICINE

## 2024-08-07 PROCEDURE — 3077F SYST BP >= 140 MM HG: CPT | Performed by: FAMILY MEDICINE

## 2024-08-07 PROCEDURE — 1123F ACP DISCUSS/DSCN MKR DOCD: CPT | Performed by: FAMILY MEDICINE

## 2024-08-07 PROCEDURE — 3044F HG A1C LEVEL LT 7.0%: CPT | Performed by: FAMILY MEDICINE

## 2024-08-07 RX ORDER — LISINOPRIL 10 MG/1
10 TABLET ORAL DAILY
Qty: 90 TABLET | Refills: 1 | Status: SHIPPED | OUTPATIENT
Start: 2024-08-07
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
## 2024-08-07 NOTE — PROGRESS NOTES
[facility]   [facility] Medicine      Chief Complaint:   Chief Complaint   Patient presents with    Medicare AWV       SUBJECTIVE:  Edmund Cochran Jr. is a 64 y.o. male who presents for   Medicare Annual Wellness exam.  Questionnaire reviewed per nursing note.    DM: Started on Jardiance 10mg daily but he stopped due to developing \"jock itch\" after about 4 weeks of taking the medication.  Last A1c was 6.2 on 1/31/24 and he was taking Semaglutide 7mg every other day at that time b/c he was having difficulty getting the insurance to approve the medication.  He is currently not on any medication.      HL: Managed with diet and exercise.  Statin intolerant.        HTN: Managed with diet and exercise.      Sarcoidosis (pulm):  Currently taking Stiolto 2 puffs daily.  Followed by Pulm.     GERD: Currently taking Pepcid.  Sx controlled. .      ED: Currently taking Viagra PRN.        PMHx:  Past Medical History:   Diagnosis Date    AR (allergic rhinitis)     Arthritis     COPD (chronic obstructive pulmonary disease) ([facility])     ED (erectile dysfunction)     GERD (gastroesophageal reflux disease)     History of stress test 05/05/2015    pt states results were normal    Tinea pedis        Meds:   Current Outpatient Medications   Medication Sig Dispense Refill    sildenafil (VIAGRA) 100 MG tablet TAKE 1/2 (ONE-HALF) TABLET BY MOUTH ONCE DAILY AS NEEDED FOR ERECTILE DYSFUNCTION 30 tablet 0    STIOLTO RESPIMAT 2.5-2.5 MCG/ACT AERS INHALE 2 PUFFS BY MOUTH ONCE DAILY 4 g 2    Famotidine-Ca Carb-Mag Hydrox -165 MG CHEW Take 1 tablet by mouth daily as needed (acid reflux) 60 tablet 3    empagliflozin (JARDIANCE) 10 MG tablet Take 1 tablet by mouth daily (Patient not taking: Reported on 3/13/2024) 30 tablet 5    DICLOFENAC PO Take by mouth      Cyanocobalamin (VITAMIN B-12 PO) Take by mouth      Multiple Vitamin (MULTIVITAMIN PO) Take by mouth       No

## 2024-08-08 DIAGNOSIS — R68.82 LOW LIBIDO: ICD-10-CM

## 2024-08-08 LAB
ALBUMIN SERPL-MCNC: 4.3 G/DL (ref 3.5–5)
ALBUMIN/GLOB SERPL: 1.2 (ref 1.1–2.2)
ALP SERPL-CCNC: 75 U/L (ref 45–117)
ALT SERPL-CCNC: 32 U/L (ref 12–78)
ANION GAP SERPL CALC-SCNC: 9 MMOL/L (ref 5–15)
AST SERPL-CCNC: 13 U/L (ref 15–37)
BILIRUB SERPL-MCNC: 0.8 MG/DL (ref 0.2–1)
BUN SERPL-MCNC: 13 MG/DL (ref 6–20)
BUN/CREAT SERPL: 13 (ref 12–20)
CALCIUM SERPL-MCNC: 9.7 MG/DL (ref 8.5–10.1)
CHLORIDE SERPL-SCNC: 101 MMOL/L (ref 97–108)
CHOLEST SERPL-MCNC: 208 MG/DL
CO2 SERPL-SCNC: 26 MMOL/L (ref 21–32)
CREAT SERPL-MCNC: 1 MG/DL (ref 0.7–1.3)
CREAT UR-MCNC: 206 MG/DL
ERYTHROCYTE [DISTWIDTH] IN BLOOD BY AUTOMATED COUNT: 13.1 % (ref 11.5–14.5)
EST. AVERAGE GLUCOSE BLD GHB EST-MCNC: 134 MG/DL
GLOBULIN SER CALC-MCNC: 3.5 G/DL (ref 2–4)
GLUCOSE SERPL-MCNC: 124 MG/DL (ref 65–100)
HBA1C MFR BLD: 6.3 % (ref 4–5.6)
HCT VFR BLD AUTO: 45 % (ref 36.6–50.3)
HDLC SERPL-MCNC: 50 MG/DL
HDLC SERPL: 4.2 (ref 0–5)
HGB BLD-MCNC: 14.3 G/DL (ref 12.1–17)
LDLC SERPL CALC-MCNC: 119.4 MG/DL (ref 0–100)
MCH RBC QN AUTO: 29.9 PG (ref 26–34)
MCHC RBC AUTO-ENTMCNC: 31.8 G/DL (ref 30–36.5)
MCV RBC AUTO: 93.9 FL (ref 80–99)
MICROALBUMIN UR-MCNC: 0.89 MG/DL
MICROALBUMIN/CREAT UR-RTO: 4 MG/G (ref 0–30)
NRBC # BLD: 0 K/UL (ref 0–0.01)
NRBC BLD-RTO: 0 PER 100 WBC
PLATELET # BLD AUTO: 250 K/UL (ref 150–400)
PMV BLD AUTO: 9.4 FL (ref 8.9–12.9)
POTASSIUM SERPL-SCNC: 4.1 MMOL/L (ref 3.5–5.1)
PROT SERPL-MCNC: 7.8 G/DL (ref 6.4–8.2)
RBC # BLD AUTO: 4.79 M/UL (ref 4.1–5.7)
SODIUM SERPL-SCNC: 136 MMOL/L (ref 136–145)
TRIGL SERPL-MCNC: 193 MG/DL
TSH SERPL DL<=0.05 MIU/L-ACNC: 0.88 UIU/ML (ref 0.36–3.74)
VLDLC SERPL CALC-MCNC: 38.6 MG/DL
WBC # BLD AUTO: 4.1 K/UL (ref 4.1–11.1)

## 2024-08-09 LAB
PSA SERPL-MCNC: 0.5 NG/ML (ref 0–4)
REFLEX CRITERIA: NORMAL

## 2024-08-09 RX ORDER — SILDENAFIL 100 MG/1
TABLET, FILM COATED ORAL
Qty: 30 TABLET | Refills: 1 | Status: SHIPPED | OUTPATIENT
Start: 2024-08-09

## 2024-08-09 NOTE — TELEPHONE ENCOUNTER
Medication Refill Request    Edmund CARLOS Cochran Jr. is requesting a refill of the following medication(s):   Requested Prescriptions     Pending Prescriptions Disp Refills    sildenafil (VIAGRA) 100 MG tablet [Pharmacy Med Name: Sildenafil Citrate 100 MG Oral Tablet] 30 tablet 0     Sig: TAKE 1/2 (ONE-HALF) TABLET BY MOUTH ONCE DAILY AS NEEDED FOR ERECTILE DYSFUNCTION        Listed PCP is Saul Evans MD   Last provider to prescribe medication: Dr Hayden  Last Date of Medication Prescribed:  07/15/2024  Date of Last Office Visit at [facility]:  08/07/2024  FUTURE APPOINTMENT:   Future Appointments   Date Time Provider Department Center   8/14/2024  2:40 PM Negrita Posadas, DO [facility] DEP       Please send refill to:  [facility]  Uli mirza     Please review request and approve or deny with recommendations.

## 2024-08-14 ENCOUNTER — OFFICE VISIT (OUTPATIENT)
Age: 64
End: 2024-08-14
Payer: MEDICARE

## 2024-08-14 VITALS
HEART RATE: 80 BPM | BODY MASS INDEX: 39.05 KG/M2 | HEIGHT: 70 IN | SYSTOLIC BLOOD PRESSURE: 127 MMHG | RESPIRATION RATE: 18 BRPM | WEIGHT: 272.8 LBS | TEMPERATURE: 97.8 F | DIASTOLIC BLOOD PRESSURE: 64 MMHG | OXYGEN SATURATION: 96 %

## 2024-08-14 DIAGNOSIS — M54.50 RIGHT-SIDED LOW BACK PAIN WITHOUT SCIATICA, UNSPECIFIED CHRONICITY: ICD-10-CM

## 2024-08-14 DIAGNOSIS — M25.522 LEFT ELBOW PAIN: Primary | ICD-10-CM

## 2024-08-14 DIAGNOSIS — M25.562 LEFT KNEE PAIN, UNSPECIFIED CHRONICITY: ICD-10-CM

## 2024-08-14 PROCEDURE — 99213 OFFICE O/P EST LOW 20 MIN: CPT | Performed by: STUDENT IN AN ORGANIZED HEALTH CARE EDUCATION/TRAINING PROGRAM

## 2024-08-14 RX ORDER — METHOCARBAMOL 750 MG/1
TABLET, FILM COATED ORAL
COMMUNITY
Start: 2024-07-14

## 2024-08-14 RX ORDER — IBUPROFEN 600 MG/1
600 TABLET ORAL EVERY 6 HOURS PRN
COMMUNITY
Start: 2024-07-14
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
## 2024-08-26 ENCOUNTER — TELEPHONE (OUTPATIENT)
Dept: PHARMACY | Facility: CLINIC | Age: 64
End: 2024-08-26

## 2024-08-26 NOTE — TELEPHONE ENCOUNTER
[facility] CLINICAL PHARMACY: ADHERENCE REVIEW  Identified care gap per [employer] fills with [facility]: Diabetes adherence    ACO  Per insurer report, LIS-2 - may be able to receive up to a 100-day supply for the same cost as a 30-day supply.  Patient also appears to be prescribed: ACE/ARB    ASSESSMENT    ACE/ARB ADHERENCE    Insurance Records claims through  24  (Prior Year PDC = not reported; YTD PDC =FIRST FILL; Potential Fail Date: 24):   LISINOPRIL 10 MG last filled on 24 for 90 day supply. Next refill due: 24    Prescribed si tablet/capsule daily    Per Reconcile Dispense History: last filled on 24 for 90 day supply.     Per [facility]: Last filled 24 for 90 day supply, 1 refill remaining.    BP Readings from Last 3 Encounters:   24 127/64   24 (!) 166/82   24 138/70     Estimated Creatinine Clearance: 96 mL/min (based on SCr of 1 mg/dL).  Lab Results   Component Value Date    CREATININE 1.00 2024     Lab Results   Component Value Date    K 4.1 2024     DIABETES ADHERENCE    Insurance Records claims through  24  (Prior Year PDC = not reported; YTD PDC = 78%; Potential Fail Date: 24):   JARDIANCE 10 MG last filled on 24 for 30 day supply. Next refill due: 24    Prescribed si tablet/capsule daily    Per Reconcile Dispense History: last filled on 24 for 30 day supply.     Per OV note on 24, Started on Jardiance 10mg daily but he stopped due to developing \"jock itch\" after about 4 weeks of taking the medication.  Last A1c was 6.2 on 24 and he was taking Semaglutide 7mg every other day at that time b/c he was having difficulty getting the insurance to approve the medication.  He is currently not on any medication.     Lab Results   Component Value Date    LABA1C 6.3 (H) 2024    LABA1C 6.2 (H) 2024    LABA1C 7.6 (H) 2023     PLAN  The following are interventions that have been  identified:  Pt no longer taking JARDIANCE 10 MG, Per OV note on 08.07.24, Started on Jardiance 10mg daily but he stopped due to developing \"jock itch\" after about 4 weeks of taking the medication.  Last A1c was 6.2 on 1/31/24 and he was taking Semaglutide 7mg every other day at that time b/c he was having difficulty getting the insurance to approve the medication.  He is currently not on any medication.   Patient eligible for 100 day supply    Outreach:  Patient:  [employer] message sent to patient.   With [facility], you can receive a 100-day (three month) supply of prescription medications for the same copay as a 30-day (one month) supply. Please request 100-day prescriptions from your provider at your next office visit.    Last Visit: 08.14.24  Next Visit: none    Karin Dempsey CPhT  Population Health    [facility]   [phone] option 1     For Pharmacy Admin Tracking Only    Program: [facility] [employer]  CPA in place:  No  Gap Closed?: Yes   Time Spent (min): 15

## 2024-08-29 ENCOUNTER — PATIENT MESSAGE (OUTPATIENT)
Age: 64
End: 2024-08-29

## 2024-08-29 DIAGNOSIS — Z12.2 SCREENING FOR LUNG CANCER: Primary | ICD-10-CM

## 2024-09-09 ENCOUNTER — TELEPHONE (OUTPATIENT)
Age: 64
End: 2024-09-09

## 2024-09-09 DIAGNOSIS — M25.562 LEFT KNEE PAIN, UNSPECIFIED CHRONICITY: Primary | ICD-10-CM

## 2024-09-09 DIAGNOSIS — M25.519 SHOULDER PAIN, UNSPECIFIED CHRONICITY, UNSPECIFIED LATERALITY: ICD-10-CM

## 2024-09-11 ENCOUNTER — HOSPITAL ENCOUNTER (OUTPATIENT)
Facility: HOSPITAL | Age: 64
Discharge: HOME | End: 2024-09-14
Payer: MEDICARE

## 2024-09-11 DIAGNOSIS — Z12.2 SCREENING FOR LUNG CANCER: ICD-10-CM

## 2024-09-11 PROCEDURE — 71271 CT THORAX LUNG CANCER SCR C-: CPT

## 2024-09-13 ENCOUNTER — HOSPITAL ENCOUNTER (OUTPATIENT)
Facility: HOSPITAL | Age: 64
Setting detail: RECURRING SERIES
Discharge: HOME | End: 2024-09-16
Payer: MEDICARE

## 2024-09-13 PROCEDURE — 97162 PT EVAL MOD COMPLEX 30 MIN: CPT | Performed by: PHYSICAL THERAPIST

## 2024-09-13 PROCEDURE — 97110 THERAPEUTIC EXERCISES: CPT | Performed by: PHYSICAL THERAPIST

## 2024-09-19 ENCOUNTER — HOSPITAL ENCOUNTER (OUTPATIENT)
Facility: HOSPITAL | Age: 64
Setting detail: RECURRING SERIES
Discharge: HOME | End: 2024-09-22
Payer: MEDICARE

## 2024-09-19 PROCEDURE — 97110 THERAPEUTIC EXERCISES: CPT | Performed by: PHYSICAL THERAPIST

## 2024-09-23 ENCOUNTER — HOSPITAL ENCOUNTER (OUTPATIENT)
Facility: HOSPITAL | Age: 64
Setting detail: RECURRING SERIES
Discharge: HOME | End: 2024-09-26
Payer: MEDICARE

## 2024-09-23 PROCEDURE — 97110 THERAPEUTIC EXERCISES: CPT | Performed by: PHYSICAL THERAPIST

## 2024-09-26 ENCOUNTER — HOSPITAL ENCOUNTER (OUTPATIENT)
Facility: HOSPITAL | Age: 64
Setting detail: RECURRING SERIES
Discharge: HOME | End: 2024-09-29
Payer: MEDICARE

## 2024-09-26 PROCEDURE — 97110 THERAPEUTIC EXERCISES: CPT | Performed by: PHYSICAL THERAPIST

## 2024-09-26 NOTE — PROGRESS NOTES
PHYSICAL THERAPY - DAILY TREATMENT NOTE (updated 3/23)      Date: 2024          Patient Name:  Edmund Cochran Jr. :  1960   Medical   Diagnosis:  Left knee pain, unspecified chronicity [M25.562]  Shoulder pain, unspecified chronicity, unspecified laterality [M25.519] Treatment Diagnosis:  M25.562  LEFT KNEE PAIN    Referral Source:  Saul Evans MD Insurance:   Payor: [facility] MEDICARE / Plan: [facility] MEDICARE ADVANTAGE / Product Type: *No Product type* /                     Patient  verified yes     Visit #   Current  / Total 4 24   Time   In / Out 5:00 PM 5:40 PM   Total Treatment Time 40   Total Timed Codes 40         SUBJECTIVE    Pain Level (0-10 scale): 3/10    Any medication changes, allergies to medications, adverse drug reactions, diagnosis change, or new procedure performed?: [x] No    [] Yes (see summary sheet for update)  Medications: Verified on Patient Summary List    Subjective functional status/changes:     Patient reports continued improvement and is happy his progress.    OBJECTIVE       Therapeutic Procedures:  Tx Min Billable or 1:1 Min (if diff from Tx Min) Procedure, Rationale, Specifics   40   64422 Therapeutic Exercise (timed):  increase ROM, strength, coordination, balance, and proprioception to improve patient's ability to progress to PLOF and address remaining functional goals. (see flow sheet as applicable)     Details if applicable:     40       Total Total           [x]  Patient Education billed concurrently with other procedures   [x] Review HEP    [] Progressed/Changed HEP, detail:    [] Other detail:         Other Objective/Functional Measures        Pain Level at end of session (0-10 scale): 3      Assessment   Patient will continue to benefit from skilled PT / OT services to modify and progress therapeutic interventions, analyze and address functional mobility deficits, analyze and address ROM deficits, analyze and address strength deficits, analyze and

## 2024-10-01 ENCOUNTER — HOSPITAL ENCOUNTER (OUTPATIENT)
Facility: HOSPITAL | Age: 64
Setting detail: RECURRING SERIES
Discharge: HOME | End: 2024-10-04
Payer: MEDICARE

## 2024-10-01 PROCEDURE — 97110 THERAPEUTIC EXERCISES: CPT

## 2024-10-01 NOTE — PROGRESS NOTES
PHYSICAL THERAPY - DAILY TREATMENT NOTE (updated 3/23)      Date: 10/1/2024          Patient Name:  Edmund Cochran Jr. :  1960   Medical   Diagnosis:  Left knee pain, unspecified chronicity [M25.562]  Shoulder pain, unspecified chronicity, unspecified laterality [M25.519] Treatment Diagnosis:  M25.562  LEFT KNEE PAIN    Referral Source:  Saul Evans MD Insurance:   Payor: [facility] MEDICARE / Plan: [facility] MEDICARE ADVANTAGE / Product Type: *No Product type* /                     Patient  verified yes     Visit #   Current  / Total 5 24   Time   In / Out 4:30 PM 5:20 PM   Total Treatment Time 50   Total Timed Codes 50         SUBJECTIVE    Pain Level (0-10 scale): 1/10    Any medication changes, allergies to medications, adverse drug reactions, diagnosis change, or new procedure performed?: [x] No    [] Yes (see summary sheet for update)  Medications: Verified on Patient Summary List    Subjective functional status/changes:     Patient reports continued improvements at this time, \"my elbow is doing much better\" \"my knee is slightly better\"    OBJECTIVE       Therapeutic Procedures:  Tx Min Billable or 1:1 Min (if diff from Tx Min) Procedure, Rationale, Specifics   50   77831 Therapeutic Exercise (timed):  increase ROM, strength, coordination, balance, and proprioception to improve patient's ability to progress to PLOF and address remaining functional goals. (see flow sheet as applicable)     Details if applicable:     50       Total Total           [x]  Patient Education billed concurrently with other procedures   [x] Review HEP    [] Progressed/Changed HEP, detail:    [] Other detail:         Other Objective/Functional Measures  Min A cuing for mini squat form, pt noting increased knee pain before cuing, no increase in pain following cuing      Pain Level at end of session (0-10 scale): 1      Assessment   Patient will continue to benefit from skilled PT / OT services to modify and progress

## 2024-10-03 ENCOUNTER — HOSPITAL ENCOUNTER (OUTPATIENT)
Facility: HOSPITAL | Age: 64
Setting detail: RECURRING SERIES
Discharge: HOME | End: 2024-10-06
Payer: MEDICARE

## 2024-10-03 PROCEDURE — 97110 THERAPEUTIC EXERCISES: CPT | Performed by: PHYSICAL THERAPIST

## 2024-10-03 NOTE — PROGRESS NOTES
strength deficits, analyze and address soft tissue restrictions, analyze and cue for proper movement patterns, analyze and modify for postural abnormalities, analyze and address imbalance/dizziness, and instruct in home and community integration to address functional deficits and attain remaining goals.    Progress toward goals / Updated goals:  []  See Progress Note/Recertification  Continued steady progress at this time towards long term functional goals at all areas.    PLAN  Yes  Continue plan of care  Re-Cert Due: 90 days  [x]  Upgrade activities as tolerated  []  Discharge due to:  []  Other:      Nolan Gallagher, PT       10/3/2024       4:30 PM

## 2024-10-08 ENCOUNTER — HOSPITAL ENCOUNTER (OUTPATIENT)
Facility: HOSPITAL | Age: 64
Setting detail: RECURRING SERIES
Discharge: HOME | End: 2024-10-11
Payer: MEDICARE

## 2024-10-08 PROCEDURE — 97110 THERAPEUTIC EXERCISES: CPT | Performed by: PHYSICAL THERAPIST

## 2024-10-08 NOTE — PROGRESS NOTES
PHYSICAL THERAPY - DAILY TREATMENT NOTE (updated 3/23)      Date: 10/8/2024          Patient Name:  Edmund Cochran Jr. :  1960   Medical   Diagnosis:  Left knee pain, unspecified chronicity [M25.562]  Shoulder pain, unspecified chronicity, unspecified laterality [M25.519] Treatment Diagnosis:  M25.562  LEFT KNEE PAIN    Referral Source:  Saul Evans MD Insurance:   Payor: [facility] MEDICARE / Plan: [facility] MEDICARE ADVANTAGE / Product Type: *No Product type* /                     Patient  verified yes     Visit #   Current  / Total 6 24   Time   In / Out 4:15 PM 5:00  PM   Total Treatment Time 45   Total Timed Codes 45         SUBJECTIVE    Pain Level (0-10 scale): 1/10    Any medication changes, allergies to medications, adverse drug reactions, diagnosis change, or new procedure performed?: [x] No    [] Yes (see summary sheet for update)  Medications: Verified on Patient Summary List    Subjective functional status/changes:     Patient continues to feel better at all sites and has been happy with the progress he made this week.    OBJECTIVE       Therapeutic Procedures:  Tx Min Billable or 1:1 Min (if diff from Tx Min) Procedure, Rationale, Specifics   45   68610 Therapeutic Exercise (timed):  increase ROM, strength, coordination, balance, and proprioception to improve patient's ability to progress to PLOF and address remaining functional goals. (see flow sheet as applicable)     Details if applicable:     45       Total Total           [x]  Patient Education billed concurrently with other procedures   [x] Review HEP    [] Progressed/Changed HEP, detail:    [] Other detail:         Other Objective/Functional Measures      Pain Level at end of session (0-10 scale): 1      Assessment   Patient will continue to benefit from skilled PT / OT services to modify and progress therapeutic interventions, analyze and address functional mobility deficits, analyze and address ROM deficits, analyze and address

## 2024-10-10 ENCOUNTER — HOSPITAL ENCOUNTER (OUTPATIENT)
Facility: HOSPITAL | Age: 64
Setting detail: RECURRING SERIES
Discharge: HOME | End: 2024-10-13
Payer: MEDICARE

## 2024-10-10 PROCEDURE — 97110 THERAPEUTIC EXERCISES: CPT | Performed by: PHYSICAL THERAPIST

## 2024-10-10 NOTE — PROGRESS NOTES
PHYSICAL THERAPY - DAILY TREATMENT NOTE (updated 3/23)      Date: 10/10/2024          Patient Name:  Edmund Cochran Jr. :  1960   Medical   Diagnosis:  Left knee pain, unspecified chronicity [M25.562]  Shoulder pain, unspecified chronicity, unspecified laterality [M25.519] Treatment Diagnosis:  M25.562  LEFT KNEE PAIN    Referral Source:  Saul Evans MD Insurance:   Payor: [facility] MEDICARE / Plan: [facility] MEDICARE ADVANTAGE / Product Type: *No Product type* /                     Patient  verified yes     Visit #   Current  / Total 7 24   Time   In / Out 4:15 PM 5:00  PM   Total Treatment Time 45   Total Timed Codes 45         SUBJECTIVE    Pain Level (0-10 scale): 1/10    Any medication changes, allergies to medications, adverse drug reactions, diagnosis change, or new procedure performed?: [x] No    [] Yes (see summary sheet for update)  Medications: Verified on Patient Summary List    Subjective functional status/changes:     No significant changes since his last visit.    OBJECTIVE       Therapeutic Procedures:  Tx Min Billable or 1:1 Min (if diff from Tx Min) Procedure, Rationale, Specifics   45   62330 Therapeutic Exercise (timed):  increase ROM, strength, coordination, balance, and proprioception to improve patient's ability to progress to PLOF and address remaining functional goals. (see flow sheet as applicable)     Details if applicable:     45       Total Total           [x]  Patient Education billed concurrently with other procedures   [x] Review HEP    [] Progressed/Changed HEP, detail:    [] Other detail:         Other Objective/Functional Measures      Pain Level at end of session (0-10 scale): 1      Assessment   Patient will continue to benefit from skilled PT / OT services to modify and progress therapeutic interventions, analyze and address functional mobility deficits, analyze and address ROM deficits, analyze and address strength deficits, analyze and address soft tissue

## 2024-10-15 ENCOUNTER — HOSPITAL ENCOUNTER (OUTPATIENT)
Facility: HOSPITAL | Age: 64
Setting detail: RECURRING SERIES
Discharge: HOME | End: 2024-10-18
Payer: MEDICARE

## 2024-10-15 PROCEDURE — 97110 THERAPEUTIC EXERCISES: CPT

## 2024-10-15 NOTE — PROGRESS NOTES
PHYSICAL THERAPY - DAILY TREATMENT NOTE (updated 3/23)      Date: 10/15/2024          Patient Name:  Edmund Cochran Jr. :  1960   Medical   Diagnosis:  Left knee pain, unspecified chronicity [M25.562]  Shoulder pain, unspecified chronicity, unspecified laterality [M25.519] Treatment Diagnosis:  M25.562  LEFT KNEE PAIN    Referral Source:  Saul Evans MD Insurance:   Payor: [facility] MEDICARE / Plan: [facility] MEDICARE ADVANTAGE / Product Type: *No Product type* /                     Patient  verified yes     Visit #   Current  / Total 9 24   Time   In / Out 4:30 PM 5:10  PM   Total Treatment Time 40   Total Timed Codes 40         SUBJECTIVE    Pain Level (0-10 scale): 1/10    Any medication changes, allergies to medications, adverse drug reactions, diagnosis change, or new procedure performed?: [x] No    [] Yes (see summary sheet for update)  Medications: Verified on Patient Summary List    Subjective functional status/changes:     Pt reporting slow gradual improvements at this time. No new pain or complaints at this time.    OBJECTIVE       Therapeutic Procedures:  Tx Min Billable or 1:1 Min (if diff from Tx Min) Procedure, Rationale, Specifics   45   63873 Therapeutic Exercise (timed):  increase ROM, strength, coordination, balance, and proprioception to improve patient's ability to progress to PLOF and address remaining functional goals. (see flow sheet as applicable)     Details if applicable:     45       Total Total           [x]  Patient Education billed concurrently with other procedures   [x] Review HEP    [] Progressed/Changed HEP, detail:    [] Other detail:         Other Objective/Functional Measures    Lumbar AROM:                                                                       R                      L  Flexion                                     4.5\" from floor                                                                   Extension                                WNL

## 2024-10-17 ENCOUNTER — HOSPITAL ENCOUNTER (OUTPATIENT)
Facility: HOSPITAL | Age: 64
Setting detail: RECURRING SERIES
Discharge: HOME | End: 2024-10-20
Payer: MEDICARE

## 2024-10-17 PROCEDURE — 97110 THERAPEUTIC EXERCISES: CPT | Performed by: PHYSICAL THERAPIST

## 2024-10-17 NOTE — PROGRESS NOTES
PHYSICAL THERAPY - DAILY TREATMENT NOTE (updated 3/23)      Date: 10/17/2024          Patient Name:  Edmund Cochran Jr. :  1960   Medical   Diagnosis:  Left knee pain, unspecified chronicity [M25.562]  Shoulder pain, unspecified chronicity, unspecified laterality [M25.519] Treatment Diagnosis:  M25.562  LEFT KNEE PAIN    Referral Source:  Saul Evans MD Insurance:   Payor: [facility] MEDICARE / Plan: [facility] MEDICARE ADVANTAGE / Product Type: *No Product type* /                     Patient  verified yes     Visit #   Current  / Total 10 24   Time   In / Out 4:30 PM 5:10  PM   Total Treatment Time 40   Total Timed Codes 40         SUBJECTIVE    Pain Level (0-10 scale): 1/10    Any medication changes, allergies to medications, adverse drug reactions, diagnosis change, or new procedure performed?: [x] No    [] Yes (see summary sheet for update)  Medications: Verified on Patient Summary List    Subjective functional status/changes:     Patient did yard work the other day and could tell that his low back is still not 100%.    OBJECTIVE       Therapeutic Procedures:  Tx Min Billable or 1:1 Min (if diff from Tx Min) Procedure, Rationale, Specifics   40   24967 Therapeutic Exercise (timed):  increase ROM, strength, coordination, balance, and proprioception to improve patient's ability to progress to PLOF and address remaining functional goals. (see flow sheet as applicable)     Details if applicable:     40       Total Total           [x]  Patient Education billed concurrently with other procedures   [x] Review HEP    [] Progressed/Changed HEP, detail:    [] Other detail:         Other Objective/Functional Measures      Pain Level at end of session (0-10 scale): 1      Assessment   Added a greater number of low back exercises to focus more on improving mobility through the lumbar spine.  Patient will continue to benefit from skilled PT / OT services to modify and progress therapeutic interventions, analyze

## 2024-10-17 NOTE — PROGRESS NOTES
Physical Therapy at [place],   a part of [facility]  [address], [address]  [place] [address]  Phone: [phone]  Fax: [phone]  PHYSICAL THERAPY PROGRESS NOTE  Patient Name:  Edmund Cochran Jr. :  1960   Treatment/Medical Diagnosis: Left knee pain, unspecified chronicity [M25.562]  Shoulder pain, unspecified chronicity, unspecified laterality [M25.519]   Referral Source:  Saul Evans MD     Date of Initial Visit:  24 Attended Visits:  9 Missed Visits:  0     SUMMARY OF TREATMENT/ASSESSMENT:  Pt demonstrating good overall improvements with his functional mobility, B shoulder AROM, and Lumbar AROM as compared to his IE. He has met all STGs at this time and is progressing well along his POC towards achieving his LTGs     CURRENT STATUS/GOALS  Pt had no increase in pain throughout testing today but continues to experience elevated pain levels at night when trying to go to sleep and after approximately 20 minutes of standing. Pt reporting decreased intensity and frequency of pain and symptoms as compared to before his IE last month. Pt remains limited with navigating a flight of stairs, increased pain during prolonged standing, and prolonged walking     Short Term Goals: To be accomplished in 12 treatments. - 3/3 MET  Patient will be able to perform a sit-to-stand transfer from a normal height chair with <2/10 low back or L knee pain. - MET  Patient will be able to reach overhead into either flexion or abduction through a full ROM without pain. - MET  Patient will be able to stand for 15 minutes with <2/10 low back pain reported. - MET     Long Term Goals: To be accomplished in 24 treatments.  Patient will be able to ambulate a flight of stairs without pain or limitation.  Patient will be able to walk 1 mile without pain or limitation.  Patient will be able to stand for 30 minutes without pain or limitation.         RECOMMENDATIONS FOR

## 2024-10-22 ENCOUNTER — HOSPITAL ENCOUNTER (OUTPATIENT)
Facility: HOSPITAL | Age: 64
Setting detail: RECURRING SERIES
Discharge: HOME | End: 2024-10-25
Payer: MEDICARE

## 2024-10-22 PROCEDURE — 97110 THERAPEUTIC EXERCISES: CPT

## 2024-10-22 NOTE — PROGRESS NOTES
continue to benefit from skilled PT / OT services to modify and progress therapeutic interventions, analyze and address functional mobility deficits, analyze and address ROM deficits, analyze and address strength deficits, analyze and address soft tissue restrictions, analyze and cue for proper movement patterns, analyze and modify for postural abnormalities, analyze and address imbalance/dizziness, and instruct in home and community integration to address functional deficits and attain remaining goals.    Progress toward goals / Updated goals:  []  See Progress Note/Recertification  Steady progress at this time towards functional goals.    PLAN  Yes  Continue plan of care  Re-Cert Due: 90 days  [x]  Upgrade activities as tolerated  []  Discharge due to:  []  Other:      Andres Rodriguez, BELEN       10/22/2024       4:42 PM

## 2024-10-24 ENCOUNTER — HOSPITAL ENCOUNTER (OUTPATIENT)
Facility: HOSPITAL | Age: 64
Setting detail: RECURRING SERIES
Discharge: HOME | End: 2024-10-27
Payer: MEDICARE

## 2024-10-24 PROCEDURE — 97110 THERAPEUTIC EXERCISES: CPT | Performed by: PHYSICAL THERAPIST

## 2024-10-24 NOTE — PROGRESS NOTES
PHYSICAL THERAPY - DAILY TREATMENT NOTE (updated 3/23)      Date: 10/24/2024          Patient Name:  Edmund Cochran Jr. :  1960   Medical   Diagnosis:  Left knee pain, unspecified chronicity [M25.562]  Shoulder pain, unspecified chronicity, unspecified laterality [M25.519] Treatment Diagnosis:  M25.562  LEFT KNEE PAIN    Referral Source:  Saul Evans MD Insurance:   Payor: [facility] MEDICARE / Plan: [facility] MEDICARE ADVANTAGE / Product Type: *No Product type* /                     Patient  verified yes     Visit #   Current  / Total 11 24   Time   In / Out 4:30 PM 5:10 PM   Total Treatment Time 40   Total Timed Codes 40         SUBJECTIVE    Pain Level (0-10 scale): 1/10    Any medication changes, allergies to medications, adverse drug reactions, diagnosis change, or new procedure performed?: [x] No    [] Yes (see summary sheet for update)  Medications: Verified on Patient Summary List    Subjective functional status/changes:     No significant change from earlier this week.    OBJECTIVE       Therapeutic Procedures:  Tx Min Billable or 1:1 Min (if diff from Tx Min) Procedure, Rationale, Specifics   40   27271 Therapeutic Exercise (timed):  increase ROM, strength, coordination, balance, and proprioception to improve patient's ability to progress to PLOF and address remaining functional goals. (see flow sheet as applicable)     Details if applicable:     40       Total Total           [x]  Patient Education billed concurrently with other procedures   [x] Review HEP    [] Progressed/Changed HEP, detail:    [] Other detail:         Other Objective/Functional Measures      Pain Level at end of session (0-10 scale): 1      Assessment   Pt tolerated all interventions well without an increase in pain or symptoms. Was able to progress postural strengthening exercises today without issue.   Patient will continue to benefit from skilled PT / OT services to modify and progress therapeutic interventions,

## 2024-10-29 ENCOUNTER — HOSPITAL ENCOUNTER (OUTPATIENT)
Facility: HOSPITAL | Age: 64
Setting detail: RECURRING SERIES
Discharge: HOME | End: 2024-11-01
Payer: MEDICARE

## 2024-10-29 PROCEDURE — 97110 THERAPEUTIC EXERCISES: CPT | Performed by: PHYSICAL THERAPIST

## 2024-10-29 NOTE — PROGRESS NOTES
PHYSICAL THERAPY - DAILY TREATMENT NOTE (updated 3/23)      Date: 10/29/2024          Patient Name:  Edmund Cochran Jr. :  1960   Medical   Diagnosis:  Left knee pain, unspecified chronicity [M25.562]  Shoulder pain, unspecified chronicity, unspecified laterality [M25.519] Treatment Diagnosis:  M25.562  LEFT KNEE PAIN    Referral Source:  Saul Evans MD Insurance:   Payor: [facility] MEDICARE / Plan: [facility] MEDICARE ADVANTAGE / Product Type: *No Product type* /                     Patient  verified yes     Visit #   Current  / Total 12 12   Time   In / Out 10:15 AM 10:55 AM   Total Treatment Time 40   Total Timed Codes 40         SUBJECTIVE    Pain Level (0-10 scale): stiff    Any medication changes, allergies to medications, adverse drug reactions, diagnosis change, or new procedure performed?: [x] No    [] Yes (see summary sheet for update)  Medications: Verified on Patient Summary List    Subjective functional status/changes:     Patient feels stiff in his lateral thigh this morning.    OBJECTIVE       Therapeutic Procedures:  Tx Min Billable or 1:1 Min (if diff from Tx Min) Procedure, Rationale, Specifics   40   70454 Therapeutic Exercise (timed):  increase ROM, strength, coordination, balance, and proprioception to improve patient's ability to progress to PLOF and address remaining functional goals. (see flow sheet as applicable)     Details if applicable:     40       Total Total           [x]  Patient Education billed concurrently with other procedures   [x] Review HEP    [] Progressed/Changed HEP, detail:    [] Other detail:         Other Objective/Functional Measures      Pain Level at end of session (0-10 scale): 0      Assessment   Patient will continue to benefit from skilled PT / OT services to modify and progress therapeutic interventions, analyze and address functional mobility deficits, analyze and address ROM deficits, analyze and address strength deficits, analyze and address soft

## 2024-10-31 ENCOUNTER — APPOINTMENT (OUTPATIENT)
Facility: HOSPITAL | Age: 64
End: 2024-10-31
Payer: MEDICARE

## 2024-11-01 DIAGNOSIS — D86.9 SARCOIDOSIS: ICD-10-CM

## 2024-11-01 RX ORDER — TIOTROPIUM BROMIDE AND OLODATEROL 3.124; 2.736 UG/1; UG/1
SPRAY, METERED RESPIRATORY (INHALATION)
Qty: 4 G | Refills: 5 | Status: SHIPPED | OUTPATIENT
Start: 2024-11-01

## 2024-11-01 NOTE — TELEPHONE ENCOUNTER
Medication Refill Request    Edmund GONZALEZ Dimas Guevara is requesting a refill of the following medication(s):   Requested Prescriptions     Pending Prescriptions Disp Refills    STIOLTO RESPIMAT 2.5-2.5 MCG/ACT AERS [Pharmacy Med Name: Stiolto Respimat 2.5-2.5 MCG/ACT Inhalation Aerosol Solution] 4 g 0     Sig: INHALE 2 PUFFS BY MOUTH ONCE DAILY        Listed PCP -  Saul Evans MD  Date of Last Office Visit at [facility]:  08/14/2024  FUTURE APPOINTMENT:   Future Appointments   Date Time Provider Department Center   11/5/2024  5:00 PM Nolan Gallagher, PT [facility]   11/7/2024  4:15 PM Nolan Gallagher, PT [facility]   11/12/2024  4:30 PM Andres Rodriguez, [facility]   11/14/2024  4:15 PM Nolan Gallagher, PT [facility]   11/19/2024  5:00 PM Nolan Gallagher, PT [facility]   11/21/2024  4:15 PM Nolan Gallagher, PT [facility]       Please send refill to:      [facility] [address] [place] [address] - [address] [facility] [phone] - F [phone]  [address] [facility] [address]  Phone: [phone] Fax: [phone]      Please review request and approve or deny with recommendations.

## 2024-11-05 ENCOUNTER — HOSPITAL ENCOUNTER (OUTPATIENT)
Facility: HOSPITAL | Age: 64
Setting detail: RECURRING SERIES
Discharge: HOME | End: 2024-11-08
Payer: MEDICARE

## 2024-11-05 PROCEDURE — 97110 THERAPEUTIC EXERCISES: CPT | Performed by: PHYSICAL THERAPIST

## 2024-11-05 NOTE — PROGRESS NOTES
PHYSICAL THERAPY - DAILY TREATMENT NOTE (updated 3/23)      Date: 2024          Patient Name:  Edmund Cochran Jr. :  1960   Medical   Diagnosis:  Left knee pain, unspecified chronicity [M25.562]  Shoulder pain, unspecified chronicity, unspecified laterality [M25.519] Treatment Diagnosis:  M25.562  LEFT KNEE PAIN    Referral Source:  Saul Evans MD Insurance:   Payor: [facility] MEDICARE / Plan: [facility] MEDICARE ADVANTAGE / Product Type: *No Product type* /                     Patient  verified yes     Visit #   Current  / Total 12 12   Time   In / Out 10:15 AM 10:55 AM   Total Treatment Time 40   Total Timed Codes 40         SUBJECTIVE    Pain Level (0-10 scale): stiff    Any medication changes, allergies to medications, adverse drug reactions, diagnosis change, or new procedure performed?: [x] No    [] Yes (see summary sheet for update)  Medications: Verified on Patient Summary List    Subjective functional status/changes:     Patient would like to continue focusing on his low back as he is feeling like things are going in the right direction from the last several visits.    OBJECTIVE       Therapeutic Procedures:  Tx Min Billable or 1:1 Min (if diff from Tx Min) Procedure, Rationale, Specifics   40   30247 Therapeutic Exercise (timed):  increase ROM, strength, coordination, balance, and proprioception to improve patient's ability to progress to PLOF and address remaining functional goals. (see flow sheet as applicable)     Details if applicable:     40       Total Total           [x]  Patient Education billed concurrently with other procedures   [x] Review HEP    [] Progressed/Changed HEP, detail:    [] Other detail:         Other Objective/Functional Measures      Pain Level at end of session (0-10 scale): 0      Assessment   Patient will continue to benefit from skilled PT / OT services to modify and progress therapeutic interventions, analyze and address functional mobility deficits,

## 2024-11-07 ENCOUNTER — HOSPITAL ENCOUNTER (OUTPATIENT)
Facility: HOSPITAL | Age: 64
Setting detail: RECURRING SERIES
Discharge: HOME | End: 2024-11-10
Payer: MEDICARE

## 2024-11-07 PROCEDURE — 97110 THERAPEUTIC EXERCISES: CPT | Performed by: PHYSICAL THERAPIST

## 2024-11-07 PROCEDURE — G0283 ELEC STIM OTHER THAN WOUND: HCPCS | Performed by: PHYSICAL THERAPIST

## 2024-11-07 NOTE — PROGRESS NOTES
PHYSICAL THERAPY - DAILY TREATMENT NOTE (updated 3/23)      Date: 2024          Patient Name:  Edmund Cochran Jr. :  1960   Medical   Diagnosis:  Left knee pain, unspecified chronicity [M25.562]  Shoulder pain, unspecified chronicity, unspecified laterality [M25.519] Treatment Diagnosis:  M25.562  LEFT KNEE PAIN    Referral Source:  Saul Evans MD Insurance:   Payor: [facility] MEDICARE / Plan: [facility] MEDICARE ADVANTAGE / Product Type: *No Product type* /                     Patient  verified yes     Visit #   Current  / Total 12 12   Time   In / Out 4:15 PM 5:10 PM   Total Treatment Time 55   Total Timed Codes 40         SUBJECTIVE    Pain Level (0-10 scale): 2/10    Any medication changes, allergies to medications, adverse drug reactions, diagnosis change, or new procedure performed?: [x] No    [] Yes (see summary sheet for update)  Medications: Verified on Patient Summary List    Subjective functional status/changes:     Patient reports that his low back is bother him today. More than usual.    OBJECTIVE       Therapeutic Procedures:  Tx Min Billable or 1:1 Min (if diff from Tx Min) Procedure, Rationale, Specifics   40   16903 Therapeutic Exercise (timed):  increase ROM, strength, coordination, balance, and proprioception to improve patient's ability to progress to PLOF and address remaining functional goals. (see flow sheet as applicable)     Details if applicable:     40       Total Total       Modalities Rationale:     decrease pain and increase tissue extensibility to improve patient's ability to progress to PLOF and address remaining functional goals.    15   min [x] Estim Unattended,             type/location:       []  w/ice    []  w/heat        min [] Estim Attended,             type/location:       []  w/ice   []  w/heat         []  w/US   []  TENS insruct            min []  Mechanical Traction,        type/lbs:        []  pro      []  sup           []  int       []  cont

## 2024-11-12 ENCOUNTER — APPOINTMENT (OUTPATIENT)
Facility: HOSPITAL | Age: 64
End: 2024-11-12
Payer: MEDICARE

## 2024-11-14 ENCOUNTER — APPOINTMENT (OUTPATIENT)
Facility: HOSPITAL | Age: 64
End: 2024-11-14
Payer: MEDICARE

## 2024-11-19 ENCOUNTER — APPOINTMENT (OUTPATIENT)
Facility: HOSPITAL | Age: 64
End: 2024-11-19
Payer: MEDICARE

## 2024-11-21 ENCOUNTER — APPOINTMENT (OUTPATIENT)
Facility: HOSPITAL | Age: 64
End: 2024-11-21
Payer: MEDICARE

## 2024-12-05 ENCOUNTER — TELEPHONE (OUTPATIENT)
Age: 64
End: 2024-12-05

## 2024-12-05 NOTE — TELEPHONE ENCOUNTER
Pt is requesting to complete a cologuard test instead of using the referral for a colonoscopy.    Please advise

## 2024-12-06 NOTE — TELEPHONE ENCOUNTER
Spoke with pt who used two identifiers (Name & )  Pt advised that he is due for a follow up visit, the cologuard will be discuss during that visit. Pt states he will give the office a call back to schedule an appt.

## 2024-12-24 ENCOUNTER — TELEPHONE (OUTPATIENT)
Age: 64
End: 2024-12-24

## 2024-12-24 NOTE — TELEPHONE ENCOUNTER
Karin from [facility] called stating that they needed to know if the patient was a diabetic or a heart disease. Karin is requesting a call back with this information at [phone].    Thanks!

## 2024-12-27 DIAGNOSIS — R68.82 LOW LIBIDO: ICD-10-CM

## 2024-12-30 RX ORDER — SILDENAFIL 100 MG/1
TABLET, FILM COATED ORAL
Qty: 30 TABLET | Refills: 0 | Status: SHIPPED | OUTPATIENT
Start: 2024-12-30

## 2025-04-29 DIAGNOSIS — R68.82 LOW LIBIDO: ICD-10-CM

## 2025-04-30 RX ORDER — SILDENAFIL 100 MG/1
TABLET, FILM COATED ORAL
Qty: 30 TABLET | Refills: 0 | Status: SHIPPED | OUTPATIENT
Start: 2025-04-30

## 2025-04-30 RX ORDER — LISINOPRIL 10 MG/1
10 TABLET ORAL DAILY
Qty: 90 TABLET | Refills: 0 | Status: SHIPPED | OUTPATIENT
Start: 2025-04-30

## 2025-06-19 ENCOUNTER — PATIENT MESSAGE (OUTPATIENT)
Age: 65
End: 2025-06-19

## 2025-06-19 NOTE — TELEPHONE ENCOUNTER
4:23 PM - Spoke with Edmund Cochran Jr. (self) who identified self with two patient identifiers (name and ).  On Release of Information Form? N/A, self    Patient Active Problem List   Diagnosis    JENNA on CPAP    Sarcoidosis    Tinea pedis    Infected nasal abrasion    Mixed hyperlipidemia    Adenomatous colon polyp    Type 2 diabetes mellitus without complication, without long-term current use of insulin (HCC)    ED (erectile dysfunction)    AR (allergic rhinitis)    Neutropenia    Vitamin B12 deficiency    Lumbar foraminal stenosis    Skin tag    Left elbow pain    Acute pain of left shoulder    Acute left-sided low back pain without sciatica    Acute pain of left knee    HTN (hypertension), benign       Past Surgical History:   Procedure Laterality Date    APPENDECTOMY      CHEST SURGERY      bronchoscopy May 2015 diagnosed sarcoidosis    HEENT      sinus lifts    SMALL INTESTINE SURGERY      scar tissue from appendectomy in  caused obstruction    UNS ORAL SURG PROC BY REPORT      dental implants       Allergies:  No Known Allergies     Chief Complaint/Subjective: Chest Pain; Foot Swelling  Chest Pain  2025 at 04:00 AM: Woke up and had some \"chips.\" After eating, went and laid back down. Woke up again at 08:00 AM with chest pain that began to worsen. Tried taking some sodium bicarbonate/baking soda and some water because thought was gas and did not have gas medication available. Also applied heat to chest and back for relief.  2025; Son had surgery and was pushing through chest pain to be there for his son. Chest pain decreased from 100% pain down to 10% of initial pain.   With every breath, would have stabbing pain and shortness of breath   Feet Problem  Last 2 days, right foot began swelling, but usually lasts a day or so and then goes away  Right foot; little toe almost to great tow  itchy, looks scalely like a scab. Reported history of foot fungus. Tried ointments and

## 2025-06-19 NOTE — TELEPHONE ENCOUNTER
Caller: Edmund Cochran Jr. (self)   Message Date: 06/19/25   Message Time: 4:11 PM   Call Back Number: [phone]       Message:   Returning call

## 2025-06-20 ENCOUNTER — APPOINTMENT (OUTPATIENT)
Facility: HOSPITAL | Age: 65
End: 2025-06-20
Payer: MEDICARE

## 2025-06-20 ENCOUNTER — HOSPITAL ENCOUNTER (EMERGENCY)
Facility: HOSPITAL | Age: 65
Discharge: HOME | End: 2025-06-20
Attending: EMERGENCY MEDICINE
Payer: MEDICARE

## 2025-06-20 ENCOUNTER — TELEPHONE (OUTPATIENT)
Age: 65
End: 2025-06-20

## 2025-06-20 ENCOUNTER — ANCILLARY PROCEDURE (OUTPATIENT)
Age: 65
End: 2025-06-20
Payer: MEDICARE

## 2025-06-20 ENCOUNTER — OFFICE VISIT (OUTPATIENT)
Age: 65
End: 2025-06-20
Payer: MEDICARE

## 2025-06-20 VITALS
HEIGHT: 72 IN | RESPIRATION RATE: 20 BRPM | WEIGHT: 265 LBS | HEART RATE: 74 BPM | OXYGEN SATURATION: 94 % | DIASTOLIC BLOOD PRESSURE: 67 MMHG | TEMPERATURE: 97.7 F | BODY MASS INDEX: 35.89 KG/M2 | SYSTOLIC BLOOD PRESSURE: 176 MMHG

## 2025-06-20 VITALS
HEIGHT: 70 IN | WEIGHT: 265.88 LBS | DIASTOLIC BLOOD PRESSURE: 67 MMHG | SYSTOLIC BLOOD PRESSURE: 124 MMHG | BODY MASS INDEX: 38.06 KG/M2 | RESPIRATION RATE: 18 BRPM | TEMPERATURE: 98.1 F | OXYGEN SATURATION: 95 % | HEART RATE: 81 BPM

## 2025-06-20 DIAGNOSIS — R07.9 CHEST PAIN, UNSPECIFIED TYPE: ICD-10-CM

## 2025-06-20 DIAGNOSIS — R07.9 CHEST PAIN, UNSPECIFIED TYPE: Primary | ICD-10-CM

## 2025-06-20 DIAGNOSIS — I31.39 PERICARDIAL EFFUSION: Primary | ICD-10-CM

## 2025-06-20 DIAGNOSIS — R07.9 CHEST PAIN: ICD-10-CM

## 2025-06-20 DIAGNOSIS — R09.1 PLEURISY: ICD-10-CM

## 2025-06-20 DIAGNOSIS — D86.9 SARCOIDOSIS: ICD-10-CM

## 2025-06-20 LAB
ALBUMIN SERPL-MCNC: 3.2 G/DL (ref 3.5–5)
ALBUMIN/GLOB SERPL: 0.7 (ref 1.1–2.2)
ALP SERPL-CCNC: 74 U/L (ref 45–117)
ALT SERPL-CCNC: 32 U/L (ref 12–78)
ANION GAP SERPL CALC-SCNC: 8 MMOL/L (ref 2–12)
AST SERPL-CCNC: 25 U/L (ref 15–37)
BASOPHILS # BLD: 0.04 K/UL (ref 0–0.1)
BASOPHILS NFR BLD: 1 % (ref 0–1)
BILIRUB SERPL-MCNC: 0.6 MG/DL (ref 0.2–1)
BUN SERPL-MCNC: 6 MG/DL (ref 6–20)
BUN/CREAT SERPL: 7 (ref 12–20)
CALCIUM SERPL-MCNC: 9.1 MG/DL (ref 8.5–10.1)
CHLORIDE SERPL-SCNC: 104 MMOL/L (ref 97–108)
CO2 SERPL-SCNC: 26 MMOL/L (ref 21–32)
CREAT SERPL-MCNC: 0.88 MG/DL (ref 0.7–1.3)
D DIMER PPP FEU-MCNC: 22.31 MG/L FEU (ref 0–0.65)
DIFFERENTIAL METHOD BLD: ABNORMAL
ECHO AO ARCH DIAM: 1.9 CM
ECHO AO ASC DIAM: 2.8 CM
ECHO AO ASCENDING AORTA INDEX: 1.17 CM/M2
ECHO AO ROOT DIAM: 3.2 CM
ECHO AO ROOT INDEX: 1.33 CM/M2
ECHO AV AREA PEAK VELOCITY: 2.7 CM2
ECHO AV AREA VTI: 3.4 CM2
ECHO AV AREA/BSA PEAK VELOCITY: 1.1 CM2/M2
ECHO AV AREA/BSA VTI: 1.4 CM2/M2
ECHO AV MEAN GRADIENT: 4 MMHG
ECHO AV MEAN VELOCITY: 1 M/S
ECHO AV PEAK GRADIENT: 8 MMHG
ECHO AV PEAK VELOCITY: 1.4 M/S
ECHO AV VELOCITY RATIO: 0.71
ECHO AV VTI: 25.2 CM
ECHO BSA: 2.47 M2
ECHO EST RA PRESSURE: 3 MMHG
ECHO LA DIAMETER INDEX: 1.79 CM/M2
ECHO LA DIAMETER: 4.3 CM
ECHO LA TO AORTIC ROOT RATIO: 1.34
ECHO LA VOL A-L A2C: 70 ML (ref 18–58)
ECHO LA VOL A-L A4C: 65 ML (ref 18–58)
ECHO LA VOL BP: 62 ML (ref 18–58)
ECHO LA VOL MOD A2C: 65 ML (ref 18–58)
ECHO LA VOL MOD A4C: 59 ML (ref 18–58)
ECHO LA VOL/BSA BIPLANE: 26 ML/M2 (ref 16–34)
ECHO LA VOLUME AREA LENGTH: 68 ML
ECHO LA VOLUME INDEX A-L A2C: 29 ML/M2 (ref 16–34)
ECHO LA VOLUME INDEX A-L A4C: 27 ML/M2 (ref 16–34)
ECHO LA VOLUME INDEX AREA LENGTH: 28 ML/M2 (ref 16–34)
ECHO LA VOLUME INDEX MOD A2C: 27 ML/M2 (ref 16–34)
ECHO LA VOLUME INDEX MOD A4C: 25 ML/M2 (ref 16–34)
ECHO LV E' LATERAL VELOCITY: 5.8 CM/S
ECHO LV E' SEPTAL VELOCITY: 8.32 CM/S
ECHO LV EDV A2C: 79 ML
ECHO LV EDV A4C: 125 ML
ECHO LV EDV BP: 106 ML (ref 67–155)
ECHO LV EDV INDEX A4C: 52 ML/M2
ECHO LV EDV INDEX BP: 44 ML/M2
ECHO LV EDV NDEX A2C: 33 ML/M2
ECHO LV EF PHYSICIAN: 65 %
ECHO LV EJECTION FRACTION A2C: 66 %
ECHO LV EJECTION FRACTION A4C: 64 %
ECHO LV EJECTION FRACTION BIPLANE: 65 % (ref 55–100)
ECHO LV ESV A2C: 27 ML
ECHO LV ESV A4C: 45 ML
ECHO LV ESV BP: 37 ML (ref 22–58)
ECHO LV ESV INDEX A2C: 11 ML/M2
ECHO LV ESV INDEX A4C: 19 ML/M2
ECHO LV ESV INDEX BP: 15 ML/M2
ECHO LV FRACTIONAL SHORTENING: 23 % (ref 28–44)
ECHO LV INTERNAL DIMENSION DIASTOLE INDEX: 2 CM/M2
ECHO LV INTERNAL DIMENSION DIASTOLIC: 4.8 CM (ref 4.2–5.9)
ECHO LV INTERNAL DIMENSION SYSTOLIC INDEX: 1.54 CM/M2
ECHO LV INTERNAL DIMENSION SYSTOLIC: 3.7 CM
ECHO LV IVSD: 1.4 CM (ref 0.6–1)
ECHO LV MASS 2D: 259.6 G (ref 88–224)
ECHO LV MASS INDEX 2D: 108.2 G/M2 (ref 49–115)
ECHO LV POSTERIOR WALL DIASTOLIC: 1.3 CM (ref 0.6–1)
ECHO LV RELATIVE WALL THICKNESS RATIO: 0.54
ECHO LVOT AREA: 3.8 CM2
ECHO LVOT AV VTI INDEX: 0.9
ECHO LVOT DIAM: 2.2 CM
ECHO LVOT MEAN GRADIENT: 2 MMHG
ECHO LVOT PEAK GRADIENT: 4 MMHG
ECHO LVOT PEAK VELOCITY: 1 M/S
ECHO LVOT STROKE VOLUME INDEX: 35.9 ML/M2
ECHO LVOT SV: 86.2 ML
ECHO LVOT VTI: 22.7 CM
ECHO MV A VELOCITY: 1.08 M/S
ECHO MV AREA VTI: 4.1 CM2
ECHO MV E DECELERATION TIME (DT): 138.1 MS
ECHO MV E VELOCITY: 0.78 M/S
ECHO MV E/A RATIO: 0.72
ECHO MV E/E' LATERAL: 13.45
ECHO MV E/E' RATIO (AVERAGED): 11.41
ECHO MV E/E' SEPTAL: 9.38
ECHO MV LVOT VTI INDEX: 0.93
ECHO MV MAX VELOCITY: 1.1 M/S
ECHO MV MEAN GRADIENT: 2 MMHG
ECHO MV MEAN VELOCITY: 0.7 M/S
ECHO MV PEAK GRADIENT: 5 MMHG
ECHO MV VTI: 21.2 CM
ECHO PERICARDIUM ANTERIOR DIMENSION: 1.8 CM
ECHO PERICARDIUM LATERAL DIMENSION: 1.5 CM
ECHO PERICARDIUM POSTERIOR DIMENSION: 1.2 CM
ECHO PV MAX VELOCITY: 1.4 M/S
ECHO PV PEAK GRADIENT: 7 MMHG
ECHO RV INTERNAL DIMENSION: 4.4 CM
ECHO RV TAPSE: 1.6 CM (ref 1.7–?)
EOSINOPHIL # BLD: 0.14 K/UL (ref 0–0.4)
EOSINOPHIL NFR BLD: 3.4 % (ref 0–7)
ERYTHROCYTE [DISTWIDTH] IN BLOOD BY AUTOMATED COUNT: 12.9 % (ref 11.5–14.5)
GLOBULIN SER CALC-MCNC: 4.6 G/DL (ref 2–4)
GLUCOSE SERPL-MCNC: 138 MG/DL (ref 65–100)
HCT VFR BLD AUTO: 36.5 % (ref 36.6–50.3)
HGB BLD-MCNC: 12 G/DL (ref 12.1–17)
IMM GRANULOCYTES # BLD AUTO: 0.01 K/UL (ref 0–0.04)
IMM GRANULOCYTES NFR BLD AUTO: 0.2 % (ref 0–0.5)
LYMPHOCYTES # BLD: 0.98 K/UL (ref 0.8–3.5)
LYMPHOCYTES NFR BLD: 23.7 % (ref 12–49)
MCH RBC QN AUTO: 29.3 PG (ref 26–34)
MCHC RBC AUTO-ENTMCNC: 32.9 G/DL (ref 30–36.5)
MCV RBC AUTO: 89 FL (ref 80–99)
MONOCYTES # BLD: 0.49 K/UL (ref 0–1)
MONOCYTES NFR BLD: 11.8 % (ref 5–13)
NEUTS SEG # BLD: 2.48 K/UL (ref 1.8–8)
NEUTS SEG NFR BLD: 59.9 % (ref 32–75)
NRBC # BLD: 0 K/UL (ref 0–0.01)
NRBC BLD-RTO: 0 PER 100 WBC
NT PRO BNP: 454 PG/ML
PLATELET # BLD AUTO: 437 K/UL (ref 150–400)
PMV BLD AUTO: 8.2 FL (ref 8.9–12.9)
POTASSIUM SERPL-SCNC: 3.8 MMOL/L (ref 3.5–5.1)
PROT SERPL-MCNC: 7.8 G/DL (ref 6.4–8.2)
RBC # BLD AUTO: 4.1 M/UL (ref 4.1–5.7)
SODIUM SERPL-SCNC: 138 MMOL/L (ref 136–145)
TROPONIN I SERPL HS-MCNC: 6 NG/L (ref 0–76)
WBC # BLD AUTO: 4.1 K/UL (ref 4.1–11.1)

## 2025-06-20 PROCEDURE — 93005 ELECTROCARDIOGRAM TRACING: CPT | Performed by: EMERGENCY MEDICINE

## 2025-06-20 PROCEDURE — 71046 X-RAY EXAM CHEST 2 VIEWS: CPT | Performed by: STUDENT IN AN ORGANIZED HEALTH CARE EDUCATION/TRAINING PROGRAM

## 2025-06-20 PROCEDURE — 6360000004 HC RX CONTRAST MEDICATION: Performed by: EMERGENCY MEDICINE

## 2025-06-20 PROCEDURE — 84484 ASSAY OF TROPONIN QUANT: CPT

## 2025-06-20 PROCEDURE — 93005 ELECTROCARDIOGRAM TRACING: CPT | Performed by: STUDENT IN AN ORGANIZED HEALTH CARE EDUCATION/TRAINING PROGRAM

## 2025-06-20 PROCEDURE — 85025 COMPLETE CBC W/AUTO DIFF WBC: CPT

## 2025-06-20 PROCEDURE — 99285 EMERGENCY DEPT VISIT HI MDM: CPT

## 2025-06-20 PROCEDURE — 93306 TTE W/DOPPLER COMPLETE: CPT

## 2025-06-20 PROCEDURE — 85379 FIBRIN DEGRADATION QUANT: CPT

## 2025-06-20 PROCEDURE — 80053 COMPREHEN METABOLIC PANEL: CPT

## 2025-06-20 PROCEDURE — 71275 CT ANGIOGRAPHY CHEST: CPT

## 2025-06-20 PROCEDURE — 36415 COLL VENOUS BLD VENIPUNCTURE: CPT

## 2025-06-20 PROCEDURE — 99213 OFFICE O/P EST LOW 20 MIN: CPT | Performed by: STUDENT IN AN ORGANIZED HEALTH CARE EDUCATION/TRAINING PROGRAM

## 2025-06-20 PROCEDURE — 83880 ASSAY OF NATRIURETIC PEPTIDE: CPT

## 2025-06-20 RX ORDER — ASPIRIN 81 MG/1
324 TABLET, CHEWABLE ORAL ONCE
Status: COMPLETED | OUTPATIENT
Start: 2025-06-20 | End: 2025-06-20

## 2025-06-20 RX ORDER — IOPAMIDOL 755 MG/ML
100 INJECTION, SOLUTION INTRAVASCULAR
Status: COMPLETED | OUTPATIENT
Start: 2025-06-20 | End: 2025-06-20

## 2025-06-20 RX ORDER — ASPIRIN 81 MG/1
324 TABLET, CHEWABLE ORAL DAILY
Status: DISCONTINUED | OUTPATIENT
Start: 2025-06-20 | End: 2025-06-20

## 2025-06-20 RX ORDER — LOSARTAN POTASSIUM 50 MG/1
50 TABLET ORAL DAILY
Qty: 30 TABLET | Refills: 0 | Status: SHIPPED | OUTPATIENT
Start: 2025-06-20 | End: 2025-07-20

## 2025-06-20 RX ORDER — COLCHICINE 0.6 MG/1
0.6 TABLET ORAL 2 TIMES DAILY
Qty: 20 TABLET | Refills: 0 | Status: SHIPPED | OUTPATIENT
Start: 2025-06-20 | End: 2025-06-30

## 2025-06-20 RX ADMIN — ASPIRIN 324 MG: 81 TABLET, CHEWABLE ORAL at 11:32

## 2025-06-20 RX ADMIN — IOPAMIDOL 100 ML: 755 INJECTION, SOLUTION INTRAVENOUS at 13:18

## 2025-06-20 SDOH — ECONOMIC STABILITY: TRANSPORTATION INSECURITY
IN THE PAST 12 MONTHS, HAS THE LACK OF TRANSPORTATION KEPT YOU FROM MEDICAL APPOINTMENTS OR FROM GETTING MEDICATIONS?: NO

## 2025-06-20 SDOH — ECONOMIC STABILITY: FOOD INSECURITY: WITHIN THE PAST 12 MONTHS, THE FOOD YOU BOUGHT JUST DIDN'T LAST AND YOU DIDN'T HAVE MONEY TO GET MORE.: NEVER TRUE

## 2025-06-20 SDOH — ECONOMIC STABILITY: INCOME INSECURITY: IN THE LAST 12 MONTHS, WAS THERE A TIME WHEN YOU WERE NOT ABLE TO PAY THE MORTGAGE OR RENT ON TIME?: NO

## 2025-06-20 SDOH — ECONOMIC STABILITY: FOOD INSECURITY: WITHIN THE PAST 12 MONTHS, YOU WORRIED THAT YOUR FOOD WOULD RUN OUT BEFORE YOU GOT MONEY TO BUY MORE.: NEVER TRUE

## 2025-06-20 ASSESSMENT — PATIENT HEALTH QUESTIONNAIRE - PHQ9
SUM OF ALL RESPONSES TO PHQ QUESTIONS 1-9: 1
SUM OF ALL RESPONSES TO PHQ QUESTIONS 1-9: 1
2. FEELING DOWN, DEPRESSED OR HOPELESS: NOT AT ALL
SUM OF ALL RESPONSES TO PHQ QUESTIONS 1-9: 1
SUM OF ALL RESPONSES TO PHQ QUESTIONS 1-9: 1
1. LITTLE INTEREST OR PLEASURE IN DOING THINGS: SEVERAL DAYS

## 2025-06-20 ASSESSMENT — PAIN DESCRIPTION - LOCATION: LOCATION: CHEST

## 2025-06-20 ASSESSMENT — PAIN DESCRIPTION - ORIENTATION: ORIENTATION: LEFT

## 2025-06-20 ASSESSMENT — PAIN SCALES - GENERAL: PAINLEVEL_OUTOF10: 1

## 2025-06-20 NOTE — ED PROVIDER NOTES
[facility] EMERGENCY DEPARTMENT  EMERGENCY DEPARTMENT ENCOUNTER      Pt Name: Edmund Cochran Jr.  MRN: [id number]  Birthdate 1960  Date of evaluation: 6/20/2025  Provider: Saul Perez MD      HISTORY OF PRESENT ILLNESS      65-year-old male with history of COPD, GERD, hypertension presents to the emergency department with a chief complaint of intermittent chest pain with shortness of breath.  Sharp type chest pain.  Symptoms for just over 2 weeks now.  Sent by primary care.    The history is provided by the patient and medical records.           Nursing Notes were reviewed.    REVIEW OF SYSTEMS         Review of Systems        PAST MEDICAL HISTORY     Past Medical History:   Diagnosis Date    AR (allergic rhinitis)     Arthritis     COPD (chronic obstructive pulmonary disease) (HCC)     ED (erectile dysfunction)     GERD (gastroesophageal reflux disease)     History of stress test 05/05/2015    pt states results were normal    HTN (hypertension), benign 08/07/2024    Sleep apnea     Tinea pedis          SURGICAL HISTORY       Past Surgical History:   Procedure Laterality Date    APPENDECTOMY  1972    CHEST SURGERY      bronchoscopy May 2015 diagnosed sarcoidosis    COLONOSCOPY      HEENT  2014    sinus lifts    SMALL INTESTINE SURGERY  1977    scar tissue from appendectomy in 1974 caused obstruction    UNS ORAL SURG PROC BY REPORT  2014    dental implants         CURRENT MEDICATIONS       Previous Medications    CYANOCOBALAMIN (VITAMIN B-12 PO)    Take by mouth    FAMOTIDINE-CA CARB-MAG HYDROX -165 MG CHEW    Take 1 tablet by mouth daily as needed (acid reflux)    IBUPROFEN (ADVIL;MOTRIN) 600 MG TABLET    Take 1 tablet by mouth every 6 hours as needed for Pain    LISINOPRIL (PRINIVIL;ZESTRIL) 10 MG TABLET    Take 1 tablet by mouth once daily    METHOCARBAMOL (ROBAXIN) 750 MG TABLET    TAKE 1 TABLET BY MOUTH EVERY 8 HOURS AS NEEDED FOR MUSCLE SPASM    MULTIPLE VITAMIN

## 2025-06-20 NOTE — PROGRESS NOTES
[facility]idency   [facility]      Chief Complaint:   Chief Complaint   Patient presents with    Chest Pain     X 1 day       SUBJECTIVE:  Edmund Cochran Jr. is a 65 y.o. male who presents for chest pain x18 days. Described as sharp and stabbing, worsened with inspiration.  Associated with shortness of breath. Unchanged with exertion. Initially located on left side of chest but now mid sternal. States waking up on 6/2/25 and ate some chips and woke up afterwards with sharp left sided chest pain. He initially thought symptoms were GI related and he drank some baking soda and water and applied a heating pad to his chest which did not provide significant relief. States not seeking medical care earlier since his son needed surgery for his hand and he wanted to be there for him. States recent stressors of recent death of his brother 2 months ago.     PMHx:  Past Medical History:   Diagnosis Date    AR (allergic rhinitis)     Arthritis     COPD (chronic obstructive pulmonary disease) (HCC)     ED (erectile dysfunction)     GERD (gastroesophageal reflux disease)     History of stress test 05/05/2015    pt states results were normal    HTN (hypertension), benign 08/07/2024    Sleep apnea     Tinea pedis        Meds:   Current Outpatient Medications   Medication Sig Dispense Refill    sildenafil (VIAGRA) 100 MG tablet TAKE 1/2 (ONE-HALF) TABLET BY MOUTH ONCE DAILY AS NEEDED FOR ERECTILE DYSFUNCTION 30 tablet 0    lisinopril (PRINIVIL;ZESTRIL) 10 MG tablet Take 1 tablet by mouth once daily (Patient taking differently: Take 1 tablet by mouth daily Patient states he coughs when he take medication, so he don't take it daily) 90 tablet 0    STIOLTO RESPIMAT 2.5-2.5 MCG/ACT AERS INHALE 2 PUFFS BY MOUTH ONCE DAILY 4 g 5    Famotidine-Ca Carb-Mag Hydrox -165 MG CHEW Take 1 tablet by mouth daily as needed (acid reflux) 60 tablet 3    Cyanocobalamin

## 2025-06-20 NOTE — TELEPHONE ENCOUNTER
Patient seen in ER not clear if being admitted.  Patient has pericardial effusion with pleuritic pain.  Please follow-up with patient and do the following    [place]  1.  If not admitted make sure they switched him from lisinopril to an ARB such as losartan since he has cough  2.  Make sure that he has a follow-up appointment with [facility] he previously saw Dr. Busch he has significant sarcoid and his PCP Dr Nathan Carrasquillo  3.  Make an appointment with one of our NP's in the next 2 to 4 weeks as outpatient to follow-up on his pain  4.  Have him get an echo in 1 month limited is fine to follow-up on the pericardial effusion and for chest pain have him get a stress nuclear test.  He likely should be able to walk.    If he is admitted then plans might change    Dr Evans let us know any way we can help with his care, our consult note is in chart

## 2025-06-20 NOTE — PROGRESS NOTES
Identified pt with two pt identifiers(name and ). Reviewed record in preparation for visit and have obtained necessary documentation.  Chief Complaint   Patient presents with    Chest Pain     X 1 day      Patient advised to go to ER on 25. Patient states he didn't go. The pain on right side from back to chest area, previously just pain in chest area.    Health Maintenance Due   Topic    Diabetic foot exam     Diabetic retinal exam     Shingles vaccine (1 of 2)    Colorectal Cancer Screen     AAA screen     Pneumococcal 50+ years Vaccine (2 of 2 - PCV)    COVID-19 Vaccine (2 -  season)    A1C test (Diabetic or Prediabetic)        Vitals:    25 1016   BP: 124/67   BP Site: Right Upper Arm   Patient Position: Sitting   BP Cuff Size: Large Adult   Pulse: 81   Resp: 18   Temp: 98.1 °F (36.7 °C)   TempSrc: Oral   SpO2: 95%   Weight: 120.6 kg (265 lb 14 oz)   Height: 1.778 m (5' 10\")         \"Have you been to the ER, urgent care clinic since your last visit?  Hospitalized since your last visit?\"    NO    “Have you seen or consulted any other health care providers outside of [facility] since your last visit?”    NO      “Have you had a colorectal cancer screening such as a colonoscopy/FIT/Cologuard?    NO    Date of last Colonoscopy: 2015  No cologuard on file  No FIT/FOBT on file   No flexible sigmoidoscopy on file         Click Here for Release of Records Request     This patient is accompanied in the office by his self.  I have received verbal consent from Edmund Cochran Jr. to discuss any/all medical information while they are present in the room.

## 2025-06-20 NOTE — CONSULTS
[facility]  Rut Cisse MD 2025   Cardiology Care Note                  [x]Initial visit     []Established visit     Patient Name: Edmund Cochran Jr. - :1960 - 65 y.o.   Rounding Cardiologist: Rut Cisse MD,[facility]  Primary Cardiologist: None  Reason for initial consult: Pleural effusion  Last EF by echo/MRI: 60%   Edmund Cochran Jr. is a 65 y.o. male   Patient reports 3 weeks ago he started to get chest pain as a band under the left breast.  It was worse with taking deep inspiration.  That remitted after about 2 weeks.  In the last week he has had a sharp pain again with deep inspiration a very focal point.  It is not positional.  He feels fairly well whether he sits up or stands back.  The reason he came to the ER is that 3 meals in the past 7 days he has had vomiting after eating.  Evaluation here showed an elevated D-dimer.  CTA of the chest showed no PE with small pleural effusions noted also a pericardial effusion was noted with lymphadenopathy noted.  EKG is NSR without significant ST findings.  Troponin is 6.  Sodium 138 potassium 3.8 creatinine 0.88 BNP is 454 which is negative for dyspneic patients hemoglobin is 12.  Echocardiogram and cardiology consult.  Echo shows normal EF and RV but a moderate circumferential pericardial effusion.  Anteriorly 1.8 posterior 1.2 in the laterally at 1.5 cm.  There is no evidence of tamponade.  Patient is not hypotensive.  He speaks comfortably without tachypnea.   Assessment and Plan     1.  Pericardial effusion of unclear source.  Moderate in severity at 1.2-1.8 cm circumferential  May be related to underlying cause of pleurisy.  Suggest anti-inflammatories.  Course for 3 weeks.  Monitor for any hypotension, no current indication for pericardiocentesis.  Could be related to pleurisy or sarcoidosis exacerbation.    2.

## 2025-06-20 NOTE — ED TRIAGE NOTES
Pt arrives with c/o CP  x 3 weeks and recently started getting SOB.  Pt endorses SOB in constant and CP worsens on inhalation.  Pt endorses 3 episodes of n/v this week.    MD Perez assessing in triage.

## 2025-06-20 NOTE — ED PROVIDER NOTES
Patient signed out to me by Dr. Perez at 3 PM pending recommendations from cardiology consult.  Patient seen by Dr. Croft while in the ED and we discussed his impression.  Pericardial effusion could be secondary to pleurisy, sarcoid, probably less likely pericarditis.  Cardiac function appears normal.  Other than his elevated D-dimer, labs are unremarkable with normal electrolytes and kidney function.  Patient is resting comfortably with stable vital signs.  No fever or hypoxia.  Dr. Luisana Dash recommended changing lisinopril to an ARB given the patient's cough.  He also recommended NSAIDs to treat pain.  He can follow-up with cardiology in 2 to 4 weeks for repeat echo and further evaluation as needed.  I also encouraged the patient to follow-up with pulmonary associates and given contact information to call and schedule this as well.  I advised the patient to return to the ER immediately if he has any worsening symptoms or other concerning changes.  Patient and his wife expressed understanding and agreement with this plan.  Stable for discharge at this time.     Saul Gotti MD  06/20/25 2407

## 2025-06-20 NOTE — PROGRESS NOTES
I saw and evaluated the patient, performing the key elements of the service. I discussed the findings, assessment and plan with the resident and agree with the resident's findings and plan as documented in the resident's note.  Patient declined EMS transport.  He is HDS.  He will drive himself to [facility] for further evaluation.

## 2025-06-21 LAB
EKG ATRIAL RATE: 83 BPM
EKG DIAGNOSIS: NORMAL
EKG P AXIS: 61 DEGREES
EKG P-R INTERVAL: 184 MS
EKG Q-T INTERVAL: 366 MS
EKG QRS DURATION: 88 MS
EKG QTC CALCULATION (BAZETT): 430 MS
EKG R AXIS: 41 DEGREES
EKG T AXIS: 72 DEGREES
EKG VENTRICULAR RATE: 83 BPM

## 2025-06-21 PROCEDURE — 93010 ELECTROCARDIOGRAM REPORT: CPT | Performed by: INTERNAL MEDICINE

## 2025-06-27 NOTE — TELEPHONE ENCOUNTER
Verified patient with two types of identifiers. Went over plan of care with Mr. Cochran. He made the medication change and has follow up with Pulmonary next week. He will make follow up with PCP. We scheduled follow up appointments. He also notes nausea and vomiting prior to hospitalization. Advised GI follow up. Provided number to Ivan Kent in [place]. Will send reiteration of conversation in [employer] message.    Future Appointments   Date Time Provider Department Center   7/7/2025  1:20 PM Helen Brown ACNP CAV BS AMB   7/9/2025  1:30 PM [facility] NUCLEAR 1 [facility] BS AMB   7/10/2025  1:30 PM [facility] NUCLEAR 1 [facility] BS AMB   7/29/2025  1:30 PM [facility] ECHO 1 [facility] BS AMB

## 2025-07-07 ENCOUNTER — OFFICE VISIT (OUTPATIENT)
Age: 65
End: 2025-07-07
Payer: MEDICARE

## 2025-07-07 VITALS
BODY MASS INDEX: 34.67 KG/M2 | HEIGHT: 72 IN | OXYGEN SATURATION: 99 % | SYSTOLIC BLOOD PRESSURE: 130 MMHG | DIASTOLIC BLOOD PRESSURE: 70 MMHG | HEART RATE: 97 BPM | WEIGHT: 256 LBS

## 2025-07-07 DIAGNOSIS — I31.39 PERICARDIAL EFFUSION: Primary | ICD-10-CM

## 2025-07-07 DIAGNOSIS — D86.9 SARCOID: ICD-10-CM

## 2025-07-07 DIAGNOSIS — I10 HTN (HYPERTENSION), BENIGN: ICD-10-CM

## 2025-07-07 DIAGNOSIS — I31.39 PERICARDIAL EFFUSION: ICD-10-CM

## 2025-07-07 PROCEDURE — 1123F ACP DISCUSS/DSCN MKR DOCD: CPT | Performed by: NURSE PRACTITIONER

## 2025-07-07 PROCEDURE — 3078F DIAST BP <80 MM HG: CPT | Performed by: NURSE PRACTITIONER

## 2025-07-07 PROCEDURE — 99214 OFFICE O/P EST MOD 30 MIN: CPT | Performed by: NURSE PRACTITIONER

## 2025-07-07 PROCEDURE — 3075F SYST BP GE 130 - 139MM HG: CPT | Performed by: NURSE PRACTITIONER

## 2025-07-07 PROCEDURE — 1125F AMNT PAIN NOTED PAIN PRSNT: CPT | Performed by: NURSE PRACTITIONER

## 2025-07-07 RX ORDER — COLCHICINE 0.6 MG/1
0.6 TABLET ORAL 2 TIMES DAILY
Qty: 20 TABLET | Refills: 0 | Status: SHIPPED | OUTPATIENT
Start: 2025-07-07 | End: 2025-07-19 | Stop reason: SDUPTHER

## 2025-07-07 ASSESSMENT — PATIENT HEALTH QUESTIONNAIRE - PHQ9
SUM OF ALL RESPONSES TO PHQ QUESTIONS 1-9: 0
2. FEELING DOWN, DEPRESSED OR HOPELESS: NOT AT ALL
1. LITTLE INTEREST OR PLEASURE IN DOING THINGS: NOT AT ALL

## 2025-07-08 LAB
ACE SERPL-CCNC: 40 U/L (ref 14–82)
ALBUMIN SERPL-MCNC: 4.1 G/DL (ref 3.9–4.9)
ALP SERPL-CCNC: 89 IU/L (ref 44–121)
ALT SERPL-CCNC: 23 IU/L (ref 0–44)
AST SERPL-CCNC: 13 IU/L (ref 0–40)
BILIRUB SERPL-MCNC: 0.6 MG/DL (ref 0–1.2)
BUN SERPL-MCNC: 10 MG/DL (ref 8–27)
BUN/CREAT SERPL: 12 (ref 10–24)
CALCIUM SERPL-MCNC: 9.5 MG/DL (ref 8.6–10.2)
CHLORIDE SERPL-SCNC: 98 MMOL/L (ref 96–106)
CO2 SERPL-SCNC: 22 MMOL/L (ref 20–29)
CREAT SERPL-MCNC: 0.84 MG/DL (ref 0.76–1.27)
CRP SERPL HS-MCNC: 100.47 MG/L (ref 0–3)
EGFRCR SERPLBLD CKD-EPI 2021: 96 ML/MIN/1.73
ERYTHROCYTE [DISTWIDTH] IN BLOOD BY AUTOMATED COUNT: 13.2 % (ref 11.6–15.4)
ERYTHROCYTE [SEDIMENTATION RATE] IN BLOOD BY WESTERGREN METHOD: 102 MM/HR (ref 0–30)
GLOBULIN SER CALC-MCNC: 3.7 G/DL (ref 1.5–4.5)
GLUCOSE SERPL-MCNC: 133 MG/DL (ref 70–99)
HCT VFR BLD AUTO: 37.1 % (ref 37.5–51)
HGB BLD-MCNC: 12 G/DL (ref 13–17.7)
MCH RBC QN AUTO: 28.6 PG (ref 26.6–33)
MCHC RBC AUTO-ENTMCNC: 32.3 G/DL (ref 31.5–35.7)
MCV RBC AUTO: 88 FL (ref 79–97)
NT-PROBNP SERPL-MCNC: 110 PG/ML (ref 0–376)
PLATELET # BLD AUTO: 349 X10E3/UL (ref 150–450)
POTASSIUM SERPL-SCNC: 4.6 MMOL/L (ref 3.5–5.2)
PROT SERPL-MCNC: 7.8 G/DL (ref 6–8.5)
RBC # BLD AUTO: 4.2 X10E6/UL (ref 4.14–5.8)
SODIUM SERPL-SCNC: 136 MMOL/L (ref 134–144)
WBC # BLD AUTO: 4.1 X10E3/UL (ref 3.4–10.8)

## 2025-07-09 LAB
ANA SER QL IF: POSITIVE
ANA SPECKLED TITR SER: ABNORMAL {TITER}
CENTROMERE B AB SER-ACNC: <0.2 AI (ref 0–0.9)
CHROMATIN AB SERPL-ACNC: <0.2 AI (ref 0–0.9)
DSDNA AB SER-ACNC: 1 IU/ML (ref 0–9)
ENA JO1 AB SER-ACNC: <0.2 AI (ref 0–0.9)
ENA RNP AB SER-ACNC: 0.4 AI (ref 0–0.9)
ENA SCL70 AB SER-ACNC: <0.2 AI (ref 0–0.9)
ENA SM AB SER-ACNC: <0.2 AI (ref 0–0.9)
ENA SS-A AB SER-ACNC: 0.9 AI (ref 0–0.9)
ENA SS-B AB SER-ACNC: <0.2 AI (ref 0–0.9)
LABORATORY COMMENT REPORT: ABNORMAL
Lab: ABNORMAL
Lab: ABNORMAL
MITOTIC SPINDLE APP AB TITR SERPL IF: ABNORMAL {TITER}

## 2025-07-10 ENCOUNTER — TRANSCRIBE ORDERS (OUTPATIENT)
Facility: HOSPITAL | Age: 65
End: 2025-07-10

## 2025-07-10 DIAGNOSIS — R59.0 MEDIASTINAL LYMPHADENOPATHY: Primary | ICD-10-CM

## 2025-07-11 ENCOUNTER — ANCILLARY PROCEDURE (OUTPATIENT)
Age: 65
End: 2025-07-11
Payer: MEDICARE

## 2025-07-11 ENCOUNTER — OFFICE VISIT (OUTPATIENT)
Age: 65
End: 2025-07-11
Payer: MEDICARE

## 2025-07-11 VITALS
WEIGHT: 258 LBS | HEART RATE: 92 BPM | SYSTOLIC BLOOD PRESSURE: 116 MMHG | BODY MASS INDEX: 34.95 KG/M2 | DIASTOLIC BLOOD PRESSURE: 62 MMHG | HEIGHT: 72 IN | OXYGEN SATURATION: 96 %

## 2025-07-11 VITALS
BODY MASS INDEX: 34.95 KG/M2 | HEIGHT: 72 IN | SYSTOLIC BLOOD PRESSURE: 116 MMHG | HEART RATE: 93 BPM | DIASTOLIC BLOOD PRESSURE: 62 MMHG | WEIGHT: 258 LBS

## 2025-07-11 DIAGNOSIS — R07.9 CHEST PAIN: ICD-10-CM

## 2025-07-11 DIAGNOSIS — E78.00 HYPERCHOLESTEREMIA: ICD-10-CM

## 2025-07-11 DIAGNOSIS — D86.9 SARCOIDOSIS: ICD-10-CM

## 2025-07-11 DIAGNOSIS — R09.1 PLEURISY: ICD-10-CM

## 2025-07-11 DIAGNOSIS — I10 HYPERTENSION, ESSENTIAL: ICD-10-CM

## 2025-07-11 DIAGNOSIS — I31.39 PERICARDIAL EFFUSION: Primary | ICD-10-CM

## 2025-07-11 DIAGNOSIS — I31.39 PERICARDIAL EFFUSION: ICD-10-CM

## 2025-07-11 LAB
ECHO BSA: 2.44 M2
ECHO EST RA PRESSURE: 3 MMHG
ECHO LV EDV A2C: 116 ML
ECHO LV EDV A4C: 114 ML
ECHO LV EDV BP: 115 ML (ref 67–155)
ECHO LV EDV INDEX A4C: 48 ML/M2
ECHO LV EDV INDEX BP: 49 ML/M2
ECHO LV EDV NDEX A2C: 49 ML/M2
ECHO LV EF PHYSICIAN: 61 %
ECHO LV EJECTION FRACTION A2C: 59 %
ECHO LV EJECTION FRACTION A4C: 66 %
ECHO LV EJECTION FRACTION BIPLANE: 61 % (ref 55–100)
ECHO LV ESV A2C: 48 ML
ECHO LV ESV A4C: 38 ML
ECHO LV ESV BP: 45 ML (ref 22–58)
ECHO LV ESV INDEX A2C: 20 ML/M2
ECHO LV ESV INDEX A4C: 16 ML/M2
ECHO LV ESV INDEX BP: 19 ML/M2
ECHO LV FRACTIONAL SHORTENING: 37 % (ref 28–44)
ECHO LV INTERNAL DIMENSION DIASTOLE INDEX: 2.19 CM/M2
ECHO LV INTERNAL DIMENSION DIASTOLIC: 5.2 CM (ref 4.2–5.9)
ECHO LV INTERNAL DIMENSION SYSTOLIC INDEX: 1.39 CM/M2
ECHO LV INTERNAL DIMENSION SYSTOLIC: 3.3 CM
ECHO LV IVSD: 1 CM (ref 0.6–1)
ECHO LV MASS 2D: 220.8 G (ref 88–224)
ECHO LV MASS INDEX 2D: 93.1 G/M2 (ref 49–115)
ECHO LV POSTERIOR WALL DIASTOLIC: 1.2 CM (ref 0.6–1)
ECHO LV RELATIVE WALL THICKNESS RATIO: 0.46
ECHO PERICARDIUM ANTERIOR DIMENSION: 1 CM
ECHO PERICARDIUM LATERAL DIMENSION: 1.7 CM
ECHO PERICARDIUM POSTERIOR DIMENSION: 1 CM

## 2025-07-11 PROCEDURE — 1123F ACP DISCUSS/DSCN MKR DOCD: CPT | Performed by: SPECIALIST

## 2025-07-11 PROCEDURE — 99214 OFFICE O/P EST MOD 30 MIN: CPT | Performed by: SPECIALIST

## 2025-07-11 PROCEDURE — 93308 TTE F-UP OR LMTD: CPT | Performed by: SPECIALIST

## 2025-07-11 PROCEDURE — 3074F SYST BP LT 130 MM HG: CPT | Performed by: SPECIALIST

## 2025-07-11 PROCEDURE — 3078F DIAST BP <80 MM HG: CPT | Performed by: SPECIALIST

## 2025-07-11 NOTE — PROGRESS NOTES
Chief Complaint   Patient presents with    pericardial effusion     Vitals:    07/11/25 1329   BP: 116/62   BP Site: Left Upper Arm   Patient Position: Sitting   Pulse: 92   SpO2: 96%   Weight: 117 kg (258 lb)   Height: 1.829 m (6')       Chest pain YES pain level 1    ER, urgent care, or hospitalized outside of [facility] since your last visit?  NO     Refills NO     Sob

## 2025-07-11 NOTE — PROGRESS NOTES
Edmund Cochran Jr.     1960       Rut Cisse MD, [facility]  Date of Visit-7/11/2025   PCP is Saul Evans MD   [facility] [place]  [facility]  HPI:  Edmund Cochran Jr. is a 65 y.o. male   Hospital follow-up.    Patient was seen 6/20/2025 by me with 3 weeks of chest pain in the left breast worse with deep inspiration.  He had vomiting after eating for several meals for the past 7 days prior   CTA showed no PE with a small pleural effusion and pericardial effusion with lymphadenopathy was noted.  His D-dimer was elevated BNP was 454 which was negative for dyspneic patient   an echocardiogram showed normal EF and RV but a moderate circumferential pericardial effusion.  Anteriorly it was 1.8 posteriorly 1.2 and laterally 1.5.  He was without dyspnea.    We felt it may relate to discomfort but has pleurisy and recommended a course of 3 weeks of anti-inflammatories   he is has hypertension     had cough lisinopril's we suggest to switch to losartan.    He has known history of sarcoid followed by pulmonary on sildenafil and still low to and the CT showed increased inflammation.    We felt he can be discharged in the ER with outpatient follow-up.  He is here today to assess his pain and symptoms.  He he has an echocardiogram today.    Today-continues to have shortness of breath that is mild to moderate occasional chest pain atypical.    Has been on colchicine for 10 days 2 a day and losartan now and needs refills.    He denies palpitations tachycardia edema syncope dizziness blood in urine or stool or fevers.  Seen 4 days ago with office Cards NP    ECHO (TTE) 6/20/2025 COMPLETE Interpretation Summary    Left Ventricle: Normal left ventricular systolic function. EF by visual approximation is 65%. Left ventricle size is normal. Normal wall thickness. Normal wall motion. Normal diastolic function.    Right Ventricle: Right ventricle size is normal. Normal

## 2025-07-12 LAB
CCP IGA+IGG SERPL IA-ACNC: <20 UNITS
REFLEX INFORMATION: ABNORMAL
RHEUMATOID FACT SERPL-ACNC: 104 IU/ML

## 2025-07-15 NOTE — PROGRESS NOTES
status: Never Used   Substance and Sexual Activity    Alcohol use: Yes     Alcohol/week: 2.0 standard drinks of alcohol     Types: 2 Drinks containing 0.5 oz of alcohol per week    Drug use: Yes     Types: Marijuana (Weed)    Sexual activity: Yes     Partners: Female     Social Drivers of Health     Financial Resource Strain: Low Risk  (6/6/2023)    Overall Financial Resource Strain (CARDIA)     Difficulty of Paying Living Expenses: Not very hard   Food Insecurity: No Food Insecurity (6/20/2025)    Hunger Vital Sign     Worried About Running Out of Food in the Last Year: Never true     Ran Out of Food in the Last Year: Never true   Transportation Needs: No Transportation Needs (6/20/2025)    PRAPARE - Transportation     Lack of Transportation (Medical): No     Lack of Transportation (Non-Medical): No   Physical Activity: Inactive (8/4/2024)    Exercise Vital Sign     Days of Exercise per Week: 0 days     Minutes of Exercise per Session: 0 min   Housing Stability: Low Risk  (6/20/2025)    Housing Stability Vital Sign     Unable to Pay for Housing in the Last Year: No     Number of Times Moved in the Last Year: 0     Homeless in the Last Year: No         MEDICATIONS     Current Outpatient Medications   Medication Sig    colchicine (COLCRYS) 0.6 MG tablet Take 1 tablet by mouth 2 times daily for 10 days    losartan (COZAAR) 50 MG tablet Take 1 tablet by mouth daily    sildenafil (VIAGRA) 100 MG tablet TAKE 1/2 (ONE-HALF) TABLET BY MOUTH ONCE DAILY AS NEEDED FOR ERECTILE DYSFUNCTION    STIOLTO RESPIMAT 2.5-2.5 MCG/ACT AERS INHALE 2 PUFFS BY MOUTH ONCE DAILY    ibuprofen (ADVIL;MOTRIN) 600 MG tablet Take 1 tablet by mouth every 6 hours as needed for Pain    Famotidine-Ca Carb-Mag Hydrox -165 MG CHEW Take 1 tablet by mouth daily as needed (acid reflux)    Multiple Vitamin (MULTIVITAMIN PO) Take by mouth (Patient not taking: Reported on 7/11/2025)     No current facility-administered medications for this visit.

## 2025-07-18 ENCOUNTER — TELEPHONE (OUTPATIENT)
Age: 65
End: 2025-07-18

## 2025-07-18 NOTE — TELEPHONE ENCOUNTER
Left message to confirm appointment(s) for 07/21/2025. Arrive @ [facility] 600  @ 1:30PM. No other information given except to follow all instructions (especially regarding medications) and left call back # [phone] if patient has any questions.  PRASHANT Vaz RN

## 2025-07-19 DIAGNOSIS — Z79.1 NSAID LONG-TERM USE: Primary | ICD-10-CM

## 2025-07-21 ENCOUNTER — ANCILLARY PROCEDURE (OUTPATIENT)
Age: 65
End: 2025-07-21
Payer: MEDICARE

## 2025-07-21 VITALS
SYSTOLIC BLOOD PRESSURE: 138 MMHG | WEIGHT: 265 LBS | HEART RATE: 93 BPM | DIASTOLIC BLOOD PRESSURE: 74 MMHG | HEIGHT: 72 IN | BODY MASS INDEX: 35.89 KG/M2

## 2025-07-21 DIAGNOSIS — R07.9 CHEST PAIN: ICD-10-CM

## 2025-07-21 DIAGNOSIS — D86.9 SARCOIDOSIS: ICD-10-CM

## 2025-07-21 DIAGNOSIS — I31.39 PERICARDIAL EFFUSION: ICD-10-CM

## 2025-07-21 PROCEDURE — A9500 TC99M SESTAMIBI: HCPCS | Performed by: SPECIALIST

## 2025-07-21 PROCEDURE — 78452 HT MUSCLE IMAGE SPECT MULT: CPT

## 2025-07-21 PROCEDURE — PBSHW PBB SHADOW CHARGE: Performed by: SPECIALIST

## 2025-07-21 RX ORDER — TETRAKIS(2-METHOXYISOBUTYLISOCYANIDE)COPPER(I) TETRAFLUOROBORATE 1 MG/ML
26.2 INJECTION, POWDER, LYOPHILIZED, FOR SOLUTION INTRAVENOUS
Status: COMPLETED | OUTPATIENT
Start: 2025-07-21 | End: 2025-07-21

## 2025-07-21 RX ADMIN — TECHNETIUM TC-99M SESTAMIBI 26.2 MILLICURIE: 1 INJECTION INTRAVENOUS at 13:40

## 2025-07-22 PROCEDURE — A9500 TC99M SESTAMIBI: HCPCS | Performed by: SPECIALIST

## 2025-07-22 PROCEDURE — PBSHW PBB SHADOW CHARGE: Performed by: SPECIALIST

## 2025-07-22 RX ORDER — TETRAKIS(2-METHOXYISOBUTYLISOCYANIDE)COPPER(I) TETRAFLUOROBORATE 1 MG/ML
26.2 INJECTION, POWDER, LYOPHILIZED, FOR SOLUTION INTRAVENOUS
Status: COMPLETED | OUTPATIENT
Start: 2025-07-22 | End: 2025-07-22

## 2025-07-22 RX ADMIN — TECHNETIUM TC-99M SESTAMIBI 26.2 MILLICURIE: 1 INJECTION INTRAVENOUS at 13:20

## 2025-07-25 LAB
ECHO BSA: 2.47 M2
NUC STRESS EJECTION FRACTION: 55 %
STRESS ANGINA INDEX: 0
STRESS BASELINE DIAS BP: 74 MMHG
STRESS BASELINE HR: 88 BPM
STRESS BASELINE ST DEPRESSION: 0 MM
STRESS BASELINE SYS BP: 138 MMHG
STRESS ESTIMATED WORKLOAD: 4.6 METS
STRESS EXERCISE DUR MIN: 3 MIN
STRESS EXERCISE DUR SEC: 0 SEC
STRESS O2 SAT PEAK: 99 %
STRESS O2 SAT REST: 97 %
STRESS PEAK DIAS BP: 70 MMHG
STRESS PEAK SYS BP: 188 MMHG
STRESS PERCENT HR ACHIEVED: 97 %
STRESS POST PEAK HR: 148 BPM
STRESS RATE PRESSURE PRODUCT: NORMAL BPM*MMHG
STRESS SR DUKE TREADMILL SCORE: 3
STRESS ST DEPRESSION: 0 MM
STRESS TARGET HR: 153 BPM
TID: 0.97

## 2025-07-30 ENCOUNTER — HOSPITAL ENCOUNTER (OUTPATIENT)
Facility: HOSPITAL | Age: 65
Discharge: HOME | End: 2025-08-02
Attending: INTERNAL MEDICINE
Payer: MEDICARE

## 2025-07-30 DIAGNOSIS — R59.0 MEDIASTINAL LYMPHADENOPATHY: ICD-10-CM

## 2025-07-30 PROCEDURE — 71260 CT THORAX DX C+: CPT

## 2025-07-30 PROCEDURE — 6360000004 HC RX CONTRAST MEDICATION: Performed by: RADIOLOGY

## 2025-07-30 RX ORDER — IOPAMIDOL 755 MG/ML
100 INJECTION, SOLUTION INTRAVASCULAR
Status: COMPLETED | OUTPATIENT
Start: 2025-07-30 | End: 2025-07-30

## 2025-07-30 RX ADMIN — IOPAMIDOL 100 ML: 755 INJECTION, SOLUTION INTRAVENOUS at 14:21

## 2025-08-01 ENCOUNTER — PATIENT MESSAGE (OUTPATIENT)
Age: 65
End: 2025-08-01

## 2025-08-01 DIAGNOSIS — D86.9 SARCOIDOSIS: ICD-10-CM

## 2025-08-01 DIAGNOSIS — R76.8 POSITIVE ANA (ANTINUCLEAR ANTIBODY): ICD-10-CM

## 2025-08-01 DIAGNOSIS — I31.39 PERICARDIAL EFFUSION: Primary | ICD-10-CM

## 2025-08-12 DIAGNOSIS — I10 HTN (HYPERTENSION), BENIGN: ICD-10-CM

## 2025-08-12 DIAGNOSIS — R76.8 POSITIVE ANA (ANTINUCLEAR ANTIBODY): ICD-10-CM

## 2025-08-12 DIAGNOSIS — I31.39 PERICARDIAL EFFUSION: Primary | ICD-10-CM

## 2025-08-12 DIAGNOSIS — I10 HTN (HYPERTENSION), BENIGN: Primary | ICD-10-CM

## 2025-08-19 DIAGNOSIS — D86.9 SARCOIDOSIS: ICD-10-CM

## 2025-08-19 DIAGNOSIS — E78.00 HYPERCHOLESTEREMIA: ICD-10-CM

## 2025-08-19 DIAGNOSIS — R09.1 PLEURISY: ICD-10-CM

## 2025-08-19 DIAGNOSIS — Z79.1 NSAID LONG-TERM USE: ICD-10-CM

## 2025-08-19 DIAGNOSIS — I10 HYPERTENSION, ESSENTIAL: ICD-10-CM

## 2025-08-19 DIAGNOSIS — I31.39 PERICARDIAL EFFUSION: ICD-10-CM

## 2025-08-20 LAB
EST. AVERAGE GLUCOSE BLD GHB EST-MCNC: 131 MG/DL
HBA1C MFR BLD: 6.2 % (ref 4.8–5.6)
SPECIMEN STATUS REPORT: NORMAL
VIT B12 SERPL-MCNC: 798 PG/ML (ref 232–1245)

## 2025-08-21 ENCOUNTER — TRANSCRIBE ORDERS (OUTPATIENT)
Facility: HOSPITAL | Age: 65
End: 2025-08-21

## 2025-08-21 DIAGNOSIS — R59.0 VIRCHOW'S NODE: Primary | ICD-10-CM

## 2025-08-21 LAB
BUN SERPL-MCNC: 14 MG/DL (ref 8–27)
BUN/CREAT SERPL: 14 (ref 10–24)
CALCIUM SERPL-MCNC: 9.8 MG/DL (ref 8.6–10.2)
CHLORIDE SERPL-SCNC: 99 MMOL/L (ref 96–106)
CO2 SERPL-SCNC: 19 MMOL/L (ref 20–29)
CREAT SERPL-MCNC: 0.97 MG/DL (ref 0.76–1.27)
EGFRCR SERPLBLD CKD-EPI 2021: 86 ML/MIN/1.73
GLUCOSE SERPL-MCNC: 124 MG/DL (ref 70–99)
POTASSIUM SERPL-SCNC: 4.9 MMOL/L (ref 3.5–5.2)
SODIUM SERPL-SCNC: 137 MMOL/L (ref 134–144)

## 2025-08-23 LAB — TESTOST FREE SERPL-MCNC: 7.7 PG/ML (ref 6.6–18.1)
